# Patient Record
Sex: MALE | Race: ASIAN | URBAN - METROPOLITAN AREA
[De-identification: names, ages, dates, MRNs, and addresses within clinical notes are randomized per-mention and may not be internally consistent; named-entity substitution may affect disease eponyms.]

---

## 2023-03-01 ENCOUNTER — INPATIENT (INPATIENT)
Facility: HOSPITAL | Age: 68
LOS: 0 days | Discharge: HOME | DRG: 312 | End: 2023-03-02
Attending: HOSPITALIST | Admitting: HOSPITALIST
Payer: MEDICARE

## 2023-03-01 VITALS
HEART RATE: 107 BPM | DIASTOLIC BLOOD PRESSURE: 99 MMHG | OXYGEN SATURATION: 96 % | TEMPERATURE: 99 F | WEIGHT: 175.05 LBS | RESPIRATION RATE: 18 BRPM | SYSTOLIC BLOOD PRESSURE: 168 MMHG | HEIGHT: 64 IN

## 2023-03-01 DIAGNOSIS — I77.810 THORACIC AORTIC ECTASIA: ICD-10-CM

## 2023-03-01 DIAGNOSIS — I10 ESSENTIAL (PRIMARY) HYPERTENSION: ICD-10-CM

## 2023-03-01 DIAGNOSIS — N40.0 BENIGN PROSTATIC HYPERPLASIA WITHOUT LOWER URINARY TRACT SYMPTOMS: ICD-10-CM

## 2023-03-01 DIAGNOSIS — E11.9 TYPE 2 DIABETES MELLITUS WITHOUT COMPLICATIONS: ICD-10-CM

## 2023-03-01 DIAGNOSIS — Z29.9 ENCOUNTER FOR PROPHYLACTIC MEASURES, UNSPECIFIED: ICD-10-CM

## 2023-03-01 DIAGNOSIS — R53.1 WEAKNESS: ICD-10-CM

## 2023-03-01 DIAGNOSIS — R55 SYNCOPE AND COLLAPSE: ICD-10-CM

## 2023-03-01 DIAGNOSIS — M79.641 PAIN IN RIGHT HAND: ICD-10-CM
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
## 2023-03-01 DIAGNOSIS — E78.5 HYPERLIPIDEMIA, UNSPECIFIED: ICD-10-CM

## 2023-03-01 LAB
ALBUMIN SERPL ELPH-MCNC: 4.7 G/DL — SIGNIFICANT CHANGE UP (ref 3.3–5)
ALP SERPL-CCNC: 87 U/L — SIGNIFICANT CHANGE UP (ref 40–120)
ALT FLD-CCNC: 38 U/L — SIGNIFICANT CHANGE UP (ref 10–45)
ANION GAP SERPL CALC-SCNC: 14 MMOL/L — SIGNIFICANT CHANGE UP (ref 5–17)
APPEARANCE UR: CLEAR — SIGNIFICANT CHANGE UP
APTT BLD: 28.4 SEC — SIGNIFICANT CHANGE UP (ref 27.5–35.5)
AST SERPL-CCNC: 25 U/L — SIGNIFICANT CHANGE UP (ref 10–40)
BASE EXCESS BLDV CALC-SCNC: 3.9 MMOL/L — HIGH (ref -2–3)
BASOPHILS # BLD AUTO: 0.11 K/UL — SIGNIFICANT CHANGE UP (ref 0–0.2)
BASOPHILS NFR BLD AUTO: 1.1 % — SIGNIFICANT CHANGE UP (ref 0–2)
BILIRUB SERPL-MCNC: 0.7 MG/DL — SIGNIFICANT CHANGE UP (ref 0.2–1.2)
BILIRUB UR-MCNC: NEGATIVE — SIGNIFICANT CHANGE UP
BUN SERPL-MCNC: 13 MG/DL — SIGNIFICANT CHANGE UP (ref 7–23)
CA-I SERPL-SCNC: 1.22 MMOL/L — SIGNIFICANT CHANGE UP (ref 1.15–1.33)
CALCIUM SERPL-MCNC: 9.8 MG/DL — SIGNIFICANT CHANGE UP (ref 8.4–10.5)
CHLORIDE BLDV-SCNC: 99 MMOL/L — SIGNIFICANT CHANGE UP (ref 96–108)
CHLORIDE SERPL-SCNC: 100 MMOL/L — SIGNIFICANT CHANGE UP (ref 96–108)
CO2 BLDV-SCNC: 32 MMOL/L — HIGH (ref 22–26)
CO2 SERPL-SCNC: 25 MMOL/L — SIGNIFICANT CHANGE UP (ref 22–31)
COLOR SPEC: COLORLESS — SIGNIFICANT CHANGE UP
CREAT SERPL-MCNC: 0.67 MG/DL — SIGNIFICANT CHANGE UP (ref 0.5–1.3)
DIFF PNL FLD: NEGATIVE — SIGNIFICANT CHANGE UP
EGFR: 102 ML/MIN/1.73M2 — SIGNIFICANT CHANGE UP
EOSINOPHIL # BLD AUTO: 0.2 K/UL — SIGNIFICANT CHANGE UP (ref 0–0.5)
EOSINOPHIL NFR BLD AUTO: 2.1 % — SIGNIFICANT CHANGE UP (ref 0–6)
FLUAV AG NPH QL: SIGNIFICANT CHANGE UP
FLUBV AG NPH QL: SIGNIFICANT CHANGE UP
GAS PNL BLDV: 135 MMOL/L — LOW (ref 136–145)
GAS PNL BLDV: SIGNIFICANT CHANGE UP
GAS PNL BLDV: SIGNIFICANT CHANGE UP
GLUCOSE BLDC GLUCOMTR-MCNC: 195 MG/DL — HIGH (ref 70–99)
GLUCOSE BLDV-MCNC: 297 MG/DL — HIGH (ref 70–99)
GLUCOSE SERPL-MCNC: 282 MG/DL — HIGH (ref 70–99)
GLUCOSE UR QL: ABNORMAL
HCO3 BLDV-SCNC: 30 MMOL/L — HIGH (ref 22–29)
HCT VFR BLD CALC: 49.9 % — SIGNIFICANT CHANGE UP (ref 39–50)
HCT VFR BLDA CALC: 52 % — HIGH (ref 39–51)
HGB BLD CALC-MCNC: 17.4 G/DL — SIGNIFICANT CHANGE UP (ref 12.6–17.4)
HGB BLD-MCNC: 16.7 G/DL — SIGNIFICANT CHANGE UP (ref 13–17)
IMM GRANULOCYTES NFR BLD AUTO: 0.3 % — SIGNIFICANT CHANGE UP (ref 0–0.9)
INR BLD: 1.03 RATIO — SIGNIFICANT CHANGE UP (ref 0.88–1.16)
KETONES UR-MCNC: NEGATIVE — SIGNIFICANT CHANGE UP
LACTATE BLDV-MCNC: 2.5 MMOL/L — HIGH (ref 0.5–2)
LEUKOCYTE ESTERASE UR-ACNC: NEGATIVE — SIGNIFICANT CHANGE UP
LYMPHOCYTES # BLD AUTO: 2.16 K/UL — SIGNIFICANT CHANGE UP (ref 1–3.3)
LYMPHOCYTES # BLD AUTO: 22.4 % — SIGNIFICANT CHANGE UP (ref 13–44)
MCHC RBC-ENTMCNC: 31 PG — SIGNIFICANT CHANGE UP (ref 27–34)
MCHC RBC-ENTMCNC: 33.5 GM/DL — SIGNIFICANT CHANGE UP (ref 32–36)
MCV RBC AUTO: 92.8 FL — SIGNIFICANT CHANGE UP (ref 80–100)
MONOCYTES # BLD AUTO: 0.87 K/UL — SIGNIFICANT CHANGE UP (ref 0–0.9)
MONOCYTES NFR BLD AUTO: 9 % — SIGNIFICANT CHANGE UP (ref 2–14)
NEUTROPHILS # BLD AUTO: 6.26 K/UL — SIGNIFICANT CHANGE UP (ref 1.8–7.4)
NEUTROPHILS NFR BLD AUTO: 65.1 % — SIGNIFICANT CHANGE UP (ref 43–77)
NITRITE UR-MCNC: NEGATIVE — SIGNIFICANT CHANGE UP
NRBC # BLD: 0 /100 WBCS — SIGNIFICANT CHANGE UP (ref 0–0)
PCO2 BLDV: 50 MMHG — SIGNIFICANT CHANGE UP (ref 42–55)
PH BLDV: 7.39 — SIGNIFICANT CHANGE UP (ref 7.32–7.43)
PH UR: 7.5 — SIGNIFICANT CHANGE UP (ref 5–8)
PLATELET # BLD AUTO: 308 K/UL — SIGNIFICANT CHANGE UP (ref 150–400)
PO2 BLDV: 39 MMHG — SIGNIFICANT CHANGE UP (ref 25–45)
POTASSIUM BLDV-SCNC: 4.2 MMOL/L — SIGNIFICANT CHANGE UP (ref 3.5–5.1)
POTASSIUM SERPL-MCNC: 3.8 MMOL/L — SIGNIFICANT CHANGE UP (ref 3.5–5.3)
POTASSIUM SERPL-SCNC: 3.8 MMOL/L — SIGNIFICANT CHANGE UP (ref 3.5–5.3)
PROT SERPL-MCNC: 7.7 G/DL — SIGNIFICANT CHANGE UP (ref 6–8.3)
PROT UR-MCNC: NEGATIVE — SIGNIFICANT CHANGE UP
PROTHROM AB SERPL-ACNC: 11.9 SEC — SIGNIFICANT CHANGE UP (ref 10.5–13.4)
RBC # BLD: 5.38 M/UL — SIGNIFICANT CHANGE UP (ref 4.2–5.8)
RBC # FLD: 12.4 % — SIGNIFICANT CHANGE UP (ref 10.3–14.5)
RSV RNA NPH QL NAA+NON-PROBE: SIGNIFICANT CHANGE UP
SAO2 % BLDV: 67.2 % — SIGNIFICANT CHANGE UP (ref 67–88)
SARS-COV-2 RNA SPEC QL NAA+PROBE: SIGNIFICANT CHANGE UP
SODIUM SERPL-SCNC: 139 MMOL/L — SIGNIFICANT CHANGE UP (ref 135–145)
SP GR SPEC: 1.03 — HIGH (ref 1.01–1.02)
TROPONIN T, HIGH SENSITIVITY RESULT: 28 NG/L — SIGNIFICANT CHANGE UP (ref 0–51)
TROPONIN T, HIGH SENSITIVITY RESULT: 63 NG/L — HIGH (ref 0–51)
UROBILINOGEN FLD QL: NEGATIVE — SIGNIFICANT CHANGE UP
WBC # BLD: 9.63 K/UL — SIGNIFICANT CHANGE UP (ref 3.8–10.5)
WBC # FLD AUTO: 9.63 K/UL — SIGNIFICANT CHANGE UP (ref 3.8–10.5)

## 2023-03-01 PROCEDURE — 99291 CRITICAL CARE FIRST HOUR: CPT

## 2023-03-01 PROCEDURE — 71045 X-RAY EXAM CHEST 1 VIEW: CPT | Mod: 26

## 2023-03-01 PROCEDURE — 70496 CT ANGIOGRAPHY HEAD: CPT | Mod: 26,MA

## 2023-03-01 PROCEDURE — 99223 1ST HOSP IP/OBS HIGH 75: CPT

## 2023-03-01 PROCEDURE — 70498 CT ANGIOGRAPHY NECK: CPT | Mod: 26,MA

## 2023-03-01 PROCEDURE — 70486 CT MAXILLOFACIAL W/O DYE: CPT | Mod: 26,QQ

## 2023-03-01 PROCEDURE — 73130 X-RAY EXAM OF HAND: CPT | Mod: 26,RT

## 2023-03-01 RX ORDER — TAMSULOSIN HYDROCHLORIDE 0.4 MG/1
0.8 CAPSULE ORAL AT BEDTIME
Refills: 0 | Status: DISCONTINUED | OUTPATIENT
Start: 2023-03-01 | End: 2023-03-02

## 2023-03-01 RX ORDER — ATORVASTATIN CALCIUM 80 MG/1
10 TABLET, FILM COATED ORAL AT BEDTIME
Refills: 0 | Status: DISCONTINUED | OUTPATIENT
Start: 2023-03-01 | End: 2023-03-01

## 2023-03-01 RX ORDER — ASPIRIN/CALCIUM CARB/MAGNESIUM 324 MG
81 TABLET ORAL DAILY
Refills: 0 | Status: DISCONTINUED | OUTPATIENT
Start: 2023-03-01 | End: 2023-03-02

## 2023-03-01 RX ORDER — LISINOPRIL 2.5 MG/1
10 TABLET ORAL DAILY
Refills: 0 | Status: DISCONTINUED | OUTPATIENT
Start: 2023-03-01 | End: 2023-03-02

## 2023-03-01 RX ORDER — ENOXAPARIN SODIUM 100 MG/ML
40 INJECTION SUBCUTANEOUS EVERY 24 HOURS
Refills: 0 | Status: DISCONTINUED | OUTPATIENT
Start: 2023-03-01 | End: 2023-03-02

## 2023-03-01 RX ORDER — ASPIRIN/CALCIUM CARB/MAGNESIUM 324 MG
324 TABLET ORAL ONCE
Refills: 0 | Status: COMPLETED | OUTPATIENT
Start: 2023-03-01 | End: 2023-03-01

## 2023-03-01 RX ORDER — AMLODIPINE BESYLATE 2.5 MG/1
5 TABLET ORAL DAILY
Refills: 0 | Status: DISCONTINUED | OUTPATIENT
Start: 2023-03-01 | End: 2023-03-02

## 2023-03-01 RX ORDER — ATORVASTATIN CALCIUM 80 MG/1
80 TABLET, FILM COATED ORAL AT BEDTIME
Refills: 0 | Status: DISCONTINUED | OUTPATIENT
Start: 2023-03-01 | End: 2023-03-02

## 2023-03-01 RX ORDER — DONEPEZIL HYDROCHLORIDE 10 MG/1
5 TABLET, FILM COATED ORAL AT BEDTIME
Refills: 0 | Status: DISCONTINUED | OUTPATIENT
Start: 2023-03-01 | End: 2023-03-02

## 2023-03-01 RX ADMIN — Medication 324 MILLIGRAM(S): at 23:09

## 2023-03-01 NOTE — ED PROVIDER NOTE - CLINICAL SUMMARY MEDICAL DECISION MAKING FREE TEXT BOX
67-year-old male with past medical history of HTN, HLD, DM 2 presents to the ED with dizziness and recurrent falls starting today. Initial vitals nonactionable.  Code stroke was called and neurology at bedside.  Physical exam as noted above.  Well-appearing male without any acute distress.  Ambulating well without difficulty.  NIH stroke scale is 0.  Differential diagnosis includes but not limited to stroke versus cardiac syncope versus metabolic derangements versus viral syndrome.  Will order labs, EKG, meds, imaging, and reassess.

## 2023-03-01 NOTE — CONSULT NOTE ADULT - ASSESSMENT
Patient BROOKLYN is a 68yo R handed M with PMHx HTN, HLD, DM, on asa who presents to ED for dizziness and recurrent falls. States LKW 2:30PM 3/1/23 after which he experienced mild dizziness and then fall. Had 4 subsequent falls since. Had fall on R hand for which it is injured and weak.  He went to urgent care where they did EKG and were concerned for which told to come to ED. Denies headaches, nausea, vomiting, visual symptoms, speech disturbance, focal extremity numbness/weakness not attributed to injury. No history of stroke. Currently mostly asymptomatic and appears comfortable. In ED code stroke called for gait instability, dizziness falls. Examined patient in CT. Patient denied hx of renal dysfunction, contrast allergy and was amenable to contrast. CT head w/o con and CTA head/neck were obtained. cbc coags wnl. cmp w/ elevated glucose 282. troponin elevated 63. lactate 2.5.     LKW: 2:30PM 3/1 but currently reports asymptomatic  NIHSS: 0   Baseline MRS: 0  Not a TNKase candidate due to nondisabling isolated neurologic deficits that were improving  Not a thrombectomy candidate due to no reported LVO, pending CTA report.     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 No intracranial bleeding. Moderate chronic microvascular ischemic changes in the periventricular deep white matter.   CTA head/neck w/ con: pending report, to be followed up    Impression: Mild non-room spinning dizziness with recurrent falls of unclear etiology, potentially peripheral versus cardiac in etiology. Will evaluate for ischemic stroke although low current clinical suspicion.       Recommendations:  [ ] Can continue aspirin 81mg daily for now    [ ] Atorvastatin 40-80 mg daily titrated per LDL < 70  [ ] HgbA1C, fasting lipid panel, CBC, CMP, coag panel, troponin  [ ] MRI brain w/o con   [ ] TTE w/ bubble study, cardiac syncope workup per primary team  [ ] telemetry, EKG, trend troponins    - glucose control (long-term goal HgbA1c < 6%)   - Neuro-checks and VS q4h   - Dysphagia screen   - fall precautions  - STAT CT head non-contrast for change in neuro exam.   - PT/ OT / DVT ppx per primary team     Discussed with stroke fellow Dr Thang Brenner and under supervision of attending  Dr Duc Haynes  regarding decision against candidacy for TNKase/ thrombectomy. Will be formally staffed on morning rounds with attending. Recommendations will be complete once signed by attending. Patient BROOKLYN is a 68yo R handed M with PMHx HTN, HLD, DM, on asa who presents to ED for dizziness and recurrent falls. States LKW 2:30PM 3/1/23 after which he experienced mild dizziness and then fall. Had 4 subsequent falls since. Had fall on R hand for which it is injured and weak.  He went to urgent care where they did EKG and were concerned for which told to come to ED. Denies headaches, nausea, vomiting, visual symptoms, speech disturbance, focal extremity numbness/weakness not attributed to injury. No history of stroke. Currently mostly asymptomatic and appears comfortable. In ED code stroke called for gait instability, dizziness falls. Examined patient in CT. Patient denied hx of renal dysfunction, contrast allergy and was amenable to contrast. CT head w/o con and CTA head/neck were obtained. cbc coags wnl. cmp w/ elevated glucose 282. troponin elevated 63. lactate 2.5.     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 No intracranial bleeding. Moderate chronic microvascular ischemic changes in the periventricular deep white matter.   CTA head/neck w/ con: no flow limiting stenosis or occlusion. Has anatomic variants. Diffusely diminuitive R vertebral artery. Ascending aorta ectasia.     Impression: Mild non-room spinning dizziness with recurrent falls of unclear etiology, potentially peripheral versus cardiac in etiology. Will evaluate for ischemic stroke although low current clinical suspicion.       Recommendations:  [ ] Can continue aspirin 81mg daily for now    [ ] Atorvastatin 40-80 mg daily titrated per LDL < 70  [ ] HgbA1C, fasting lipid panel, CBC, CMP, coag panel, troponin  [ ] MRI brain w/o con   [ ] TTE w/ bubble study, cardiac syncope workup per primary team  [ ] telemetry, EKG, trend troponins  [ ] ascending aorta ectasia and remainder of imaging report findings w/u per primary team    - glucose control (long-term goal HgbA1c < 6%)   - Neuro-checks and VS q4h   - Dysphagia screen   - fall precautions  - STAT CT head non-contrast for change in neuro exam.   - PT/ OT / DVT ppx per primary team     Discussed with stroke fellow Dr Thang Brenner and under supervision of attending  Dr Duc Haynes  regarding decision against candidacy for TNKase/ thrombectomy. Will be formally staffed on morning rounds with attending. Recommendations will be complete once signed by attending. Patient BROOKLYN is a 68yo R handed M with PMHx HTN, HLD, DM, on asa who presents to ED for dizziness and recurrent falls. States LKW 2:30PM 3/1/23 after which he experienced mild dizziness and then fall. Had 4 subsequent falls since. Had fall on R hand for which it is injured and weak.  He went to urgent care where they did EKG and were concerned for which told to come to ED. Denies headaches, nausea, vomiting, visual symptoms, speech disturbance, focal extremity numbness/weakness not attributed to injury. No history of stroke. Currently mostly asymptomatic and appears comfortable. In ED code stroke called for gait instability, dizziness falls. Examined patient in CT. Patient denied hx of renal dysfunction, contrast allergy and was amenable to contrast. CT head w/o con and CTA head/neck were obtained. cbc coags wnl. cmp w/ elevated glucose 282. troponin elevated 63. lactate 2.5.     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 No intracranial bleeding. Moderate chronic microvascular ischemic changes in the periventricular deep white matter.     CTA head/neck w/ con: no flow limiting stenosis or occlusion. Has anatomic variants. Diffusely diminuitive R vertebral artery. Ascending aorta ectasia.     Impression: Mild non-room spinning dizziness with recurrent falls of unclear etiology, potentially peripheral vertigo versus cardiac in etiology. Our service will assist in evaluating for ischemic stroke although low current clinical suspicion.       Recommendations:  [ ] Can continue aspirin 81mg daily for now    [ ] Atorvastatin 40-80 mg daily titrated per LDL < 70  [ ] HgbA1C, fasting lipid panel, CBC, CMP, coag panel, troponin  [ ] MRI brain w/o con   [ ] TTE w/ bubble study, cardiac syncope workup per primary team  [ ] telemetry, EKG, trend troponins  [ ] ascending aorta ectasia and remainder of imaging report findings w/u per primary team    - glucose control (long-term goal HgbA1c < 6%)   - Neuro-checks and VS q4h   - Dysphagia screen   - fall precautions  - STAT CT head non-contrast for change in neuro exam.   - PT/ OT / DVT ppx per primary team     Discussed with stroke fellow Dr Thang Brenner and under supervision of attending  Dr Duc Haynes  regarding decision against candidacy for TNKase/ thrombectomy. Will be formally staffed on morning rounds with attending. Recommendations will be complete once signed by attending.

## 2023-03-01 NOTE — H&P ADULT - PROBLEM SELECTOR PLAN 3
Fell onto hand, no fracture on XR  - Ice packs  - Tylenol  - Consider celecoxib 100 mg BID (d/t ASA) if pain not controlled with ice and tylenol

## 2023-03-01 NOTE — H&P ADULT - NSHPLABSRESULTS_GEN_ALL_CORE
16.7   9.63  )-----------( 308      ( 01 Mar 2023 18:40 )             49.9     03-01    139  |  100  |  13  ----------------------------<  282<H>  3.8   |  25  |  0.67    Ca    9.8      01 Mar 2023 18:40    TPro  7.7  /  Alb  4.7  /  TBili  0.7  /  DBili  x   /  AST  25  /  ALT  38  /  AlkPhos  87  03-01    Troponin T, High Sensitivity Result: 63  Troponin T, High Sensitivity Result: 28    Blood Gas Venous - Lactate: 2.5    EKG with 16.7   9.63  )-----------( 308      ( 01 Mar 2023 18:40 )             49.9     03-01    139  |  100  |  13  ----------------------------<  282<H>  3.8   |  25  |  0.67    Ca    9.8      01 Mar 2023 18:40    TPro  7.7  /  Alb  4.7  /  TBili  0.7  /  DBili  x   /  AST  25  /  ALT  38  /  AlkPhos  87  03-01    Troponin T, High Sensitivity Result: 63  Troponin T, High Sensitivity Result: 28    Blood Gas Venous - Lactate: 2.5    x< from: Xray Hand 3 Views, Right (03.01.23 @ 19:27) >    JOINTS    Joint Space(s):  There are small-to-moderate sized osteophytes and tiny   joint bodies at the 1st carpometacarpal joint. Tiny 2nd through 5th   metacarpal phalangeal joint osteophytes are noted.Small intraosseous   ganglion are noted within the 3rd and 4th middle phalangeal bases.    SOFT TISSUES:  Soft tissue swelling is suggested around the metacarpal   phalangeal region. No gas is seen.    IMPRESSION:  1.  No acute osseous abnormalities.  < from: CT Angio Neck Stroke Protocol w/ IV Cont (03.01.23 @ 18:54) >    IMPRESSION:    CTA BRAIN: Patent central intracranial circulation. No flow-limiting   stenosis or occlusion.    Anatomic variants: Bilateral fetal PCA circulation. Hypoplastic right A1   segment.    CTA NECK: Patent cervical vasculature. No flow-limiting stenosis or   occlusion.    Ascending aortic ectasia to 4.1 cm.    --- End of Report ---    < end of copied text >      < end of copied text >        EKG sinus rhythm, no acute ischemic changes

## 2023-03-01 NOTE — CONSULT NOTE ADULT - SUBJECTIVE AND OBJECTIVE BOX
Neurology Consultation     Used [facility] [id number]    HPI: Patient BROOKLYN is a 68yo R handed M with PMHx HTN, HLD, DM, on asa who presents to ED for dizziness and recurrent falls. States LKW 2:30PM 3/1/23 after which he experienced mild dizziness and then fall. Had 4 subsequent falls since. Had fall on R hand for which it is injured and weak. Went to urgent care where they did EKG and were concerned for which told to come to ED. Denies headaches, nausea, vomiting, visual symptoms, speech disturbance, focal extremity numbness/weakness not attributed to injury. No history of stroke.     In ED code stroke called for gait instability, dizziness falls. Examined patient in CT. Patient denied hx of renal dysfunction, contrast allergy and was amenable to contrast. CT head w/o con and CTA head/neck were obtained.      NIHSS: 0  preMRS: 0       PAST MEDICAL & SURGICAL HISTORY:    FAMILY HISTORY:    SOCIAL HISTORY:      MEDICATIONS (HOME):  Home Medications:    MEDICATIONS  (STANDING):    MEDICATIONS  (PRN):    ALLERGIES/INTOLERANCES:  Allergies  No Known Allergies    Intolerances    VITALS & EXAMINATION:  Vital Signs Last 24 Hrs  T(C): 37 (01 Mar 2023 18:15), Max: 37 (01 Mar 2023 18:15)  T(F): 98.6 (01 Mar 2023 18:15), Max: 98.6 (01 Mar 2023 18:15)  HR: 107 (01 Mar 2023 18:15) (107 - 107)  BP: 168/99 (01 Mar 2023 18:15) (168/99 - 168/99)  BP(mean): --  RR: 18 (01 Mar 2023 18:15) (18 - 18)  SpO2: 96% (01 Mar 2023 18:15) (96% - 96%)    Parameters below as of 01 Mar 2023 18:15  Patient On (Oxygen Delivery Method): room air    General:  Constitutional: Male, appears stated age, not in distress, pleasant   Eyes: clear sclera;   Extremities: R hand with bruise, was bandaged by urgent care   Resp: breathing comfortably     Neurological (>12):  MS: Awake, alert. Oriented person place situation. Follows all commands. Attends to examiner  Language: Speech is hypophonic, clear, fluent, good repetition, comprehension, registration of words.  CNs: PERRL (R 3mm, L 3mm). VFF. EOMI no nystagmus. V1-3 intact LT, No carine facial asymmetry b/l. Has dried blood around lips. Hearing grossly normal.     Motor - Normal bulk and tone throughout. No pronator drift.    L/R         Deltoid  5/5    Biceps   5/5      Triceps  5/5         Wrist Extension 5/4   Wrist Flexion  5/4      5/4   (R distal extremity injury from fall)  L/R         Hip Flexion  5/5    Hip Extension  5/5  Knee Extension  5/5  Dorsiflexion  5/5      Plantar Flexion 5/5     Sensation: Intact to LT b/l.    Toes: mute  No ankle clonus  Coordination: No dysmetria to FTN b/l UE  Gait: mild wide based gait, knees bowed out. Able to ambulate independently     LABORATORY:  CBC                       16.7   9.63  )-----------( 308      ( 01 Mar 2023 18:40 )             49.9     Chem       LFTs   Coagulopathy   Lipid Panel   A1c   Cardiac enzymes     U/A   CSF  Other    STUDIES & IMAGING: (EEG, CT, MR, U/S, TTE/MATI): Neurology Consultation     Used [employer] [facility] [id number]    HPI: Patient BROOKLYN is a 68yo R handed M with PMHx HTN, HLD, DM, on asa who presents to ED for dizziness and recurrent falls. States LKW 2:30PM 3/1/23 after which he experienced mild dizziness and then fall. Had 4 subsequent falls since. Had fall on R hand for which it is injured and weak.  He went to urgent care where they did EKG and were concerned for which told to come to ED. Denies headaches, nausea, vomiting, visual symptoms, speech disturbance, focal extremity numbness/weakness not attributed to injury. No history of stroke. Currently mostly asymptomatic and appears comfortable. In ED code stroke called for gait instability, dizziness falls. Examined patient in CT. Patient denied hx of renal dysfunction, contrast allergy and was amenable to contrast. CT head w/o con and CTA head/neck were obtained.      NIHSS: 0  preMRS: 0       PAST MEDICAL & SURGICAL HISTORY: HTN HLD DM    MEDICATIONS (HOME):  Home Medications:    MEDICATIONS  (STANDING):    MEDICATIONS  (PRN):    ALLERGIES/INTOLERANCES:  Allergies  No Known Allergies    Intolerances    VITALS & EXAMINATION:  Vital Signs Last 24 Hrs  T(C): 37 (01 Mar 2023 18:15), Max: 37 (01 Mar 2023 18:15)  T(F): 98.6 (01 Mar 2023 18:15), Max: 98.6 (01 Mar 2023 18:15)  HR: 107 (01 Mar 2023 18:15) (107 - 107)  BP: 168/99 (01 Mar 2023 18:15) (168/99 - 168/99)  BP(mean): --  RR: 18 (01 Mar 2023 18:15) (18 - 18)  SpO2: 96% (01 Mar 2023 18:15) (96% - 96%)    Parameters below as of 01 Mar 2023 18:15  Patient On (Oxygen Delivery Method): room air    General:  Constitutional: Male, appears stated age, not in distress, pleasant   Eyes: clear sclera;   Extremities: R hand with bruise, was bandaged by urgent care   Resp: breathing comfortably     Neurological (>12):  MS: Awake, alert. Oriented person place situation. Follows all commands. Attends to examiner  Language: Speech is hypophonic, clear, fluent, good repetition, comprehension, registration of words.  CNs: PERRL (R 3mm, L 3mm). VFF. EOMI no nystagmus. V1-3 intact LT, No carine facial asymmetry b/l. Has dried blood around lips. Hearing grossly normal.     Motor - Normal bulk and tone throughout. No pronator drift.    L/R         Deltoid  5/5    Biceps   5/5      Triceps  5/5         Wrist Extension 5/4   Wrist Flexion  5/4      5/4   (R distal extremity injury from fall)  L/R         Hip Flexion  5/5    Hip Extension  5/5  Knee Extension  5/5  Dorsiflexion  5/5      Plantar Flexion 5/5     Sensation: Intact to LT b/l.    Toes: mute  No ankle clonus  Coordination: No dysmetria to FTN b/l UE  Gait: mild wide based gait, knees bowed out. Able to ambulate independently     LABORATORY:  CBC                       16.7   9.63  )-----------( 308      ( 01 Mar 2023 18:40 )             49.9     Chem       LFTs   Coagulopathy   Lipid Panel   A1c   Cardiac enzymes     U/A   CSF  Other    STUDIES & IMAGING: (EEG, CT, MR, U/S, TTE/MATI):    < from: CT Brain Stroke Protocol (03.01.23 @ 18:53) >    ACC: [id number] EXAM:  CT BRAIN STROKE PROTOCOL   ORDERED BY: SERGIO SEALS     PROCEDURE DATE:  03/01/2023          INTERPRETATION:  HISTORY: Code stroke. Dizziness and fall.    COMPARISON: None.    TECHNIQUE: Axial noncontrast CT images from the skull base to the vertex   were obtained and submitted for interpretation. Coronal and sagittal   reformatted images were performed. Bone and soft tissue windows were   evaluated.    FINDINGS:    There is no acute intracranial mass-effect, hemorrhage, midline shift, or   abnormal extra-axial fluid collection.    Chronic lacunar infarction in the posterior limb of the left internal   capsule extends into the corona radiata and centrum semiovale.    Mild centrally predominant cerebral volume lossand moderate chronic   microvascular ischemic changes in the periventricular deep white matter.   No hydrocephalus. Basal cisterns are patent.    Scattered ethmoid sinus mucosal thickening noted. Remaining paranasal   sinuses and mastoid air cells are clear. Calvarium is intact.    IMPRESSION:    No acute intracranial bleeding.    Chronic posterior limb left internal capsule/corona radiata/centrum   semiovale lacunar infarction.    Findings were discussed by Dr. Portillo with MD Gerard on 3/1/10001:49 PM   with read back confirmation.    --- End of Report ---    ARIEL PORTILLO MD; Resident Radiologist  This document has been electronically signed.  KADIE SMITH MD; Attending Radiologist  This document has been electronically signed. Mar 1 2023  7:49PM    < end of copied text >   Neurology Consultation     Used [employer] [facility] [id number]    HPI: Patient BROOKLYN is a 68yo R handed M with PMHx HTN, HLD, DM, on asa who presents to ED for dizziness and recurrent falls. States LKW 2:30PM 3/1/23 after which he experienced mild dizziness and then fall. Had 4 subsequent falls since. Had fall on R hand for which it is injured and weak.  He went to urgent care where they did EKG and were concerned for which told to come to ED. Denies headaches, nausea, vomiting, visual symptoms, speech disturbance, focal extremity numbness/weakness not attributed to injury. No history of stroke. Currently mostly asymptomatic and appears comfortable. In ED code stroke called for gait instability, dizziness falls. Examined patient in CT. Patient denied hx of renal dysfunction, contrast allergy and was amenable to contrast. CT head w/o con and CTA head/neck were obtained.      NIHSS: 0  preMRS: 0       PAST MEDICAL & SURGICAL HISTORY: HTN HLD DM    MEDICATIONS (HOME):  Home Medications:    MEDICATIONS  (STANDING):    MEDICATIONS  (PRN):    ALLERGIES/INTOLERANCES:  Allergies  No Known Allergies    Intolerances    VITALS & EXAMINATION:  Vital Signs Last 24 Hrs  T(C): 37 (01 Mar 2023 18:15), Max: 37 (01 Mar 2023 18:15)  T(F): 98.6 (01 Mar 2023 18:15), Max: 98.6 (01 Mar 2023 18:15)  HR: 107 (01 Mar 2023 18:15) (107 - 107)  BP: 168/99 (01 Mar 2023 18:15) (168/99 - 168/99)  BP(mean): --  RR: 18 (01 Mar 2023 18:15) (18 - 18)  SpO2: 96% (01 Mar 2023 18:15) (96% - 96%)    Parameters below as of 01 Mar 2023 18:15  Patient On (Oxygen Delivery Method): room air    General:  Constitutional: Male, appears stated age, not in distress, pleasant   Eyes: clear sclera;   Extremities: R hand with bruise, was bandaged by urgent care   Resp: breathing comfortably     Neurological (>12):  MS: Awake, alert. Oriented person place situation. Follows all commands. Attends to examiner  Language: Speech is hypophonic, clear, fluent, good repetition, comprehension, registration of words.  CNs: PERRL (R 3mm, L 3mm). VFF. EOMI no nystagmus. V1-3 intact LT, No carine facial asymmetry b/l. Has dried blood around lips. Hearing grossly normal.     Motor - Normal bulk and tone throughout. No pronator drift.    L/R         Deltoid  5/5    Biceps   5/5      Triceps  5/5         Wrist Extension 5/4   Wrist Flexion  5/4      5/4   (R distal extremity injury from fall)  L/R         Hip Flexion  5/5    Hip Extension  5/5  Knee Extension  5/5  Dorsiflexion  5/5      Plantar Flexion 5/5     Sensation: Intact to LT b/l.    Toes: mute  No ankle clonus  Coordination: No dysmetria to FTN b/l UE  Gait: mild wide based gait, knees bowed out. Able to ambulate independently     LABORATORY:  CBC                       16.7   9.63  )-----------( 308      ( 01 Mar 2023 18:40 )             49.9     Chem       LFTs   Coagulopathy   Lipid Panel   A1c   Cardiac enzymes     U/A   CSF  Other    STUDIES & IMAGING: (EEG, CT, MR, U/S, TTE/MATI):    < from: CT Brain Stroke Protocol (03.01.23 @ 18:53) >    ACC: [id number] EXAM:  CT BRAIN STROKE PROTOCOL   ORDERED BY: SERGIO SEALS     PROCEDURE DATE:  03/01/2023          INTERPRETATION:  HISTORY: Code stroke. Dizziness and fall.    COMPARISON: None.    TECHNIQUE: Axial noncontrast CT images from the skull base to the vertex   were obtained and submitted for interpretation. Coronal and sagittal   reformatted images were performed. Bone and soft tissue windows were   evaluated.    FINDINGS:    There is no acute intracranial mass-effect, hemorrhage, midline shift, or   abnormal extra-axial fluid collection.    Chronic lacunar infarction in the posterior limb of the left internal   capsule extends into the corona radiata and centrum semiovale.    Mild centrally predominant cerebral volume lossand moderate chronic   microvascular ischemic changes in the periventricular deep white matter.   No hydrocephalus. Basal cisterns are patent.    Scattered ethmoid sinus mucosal thickening noted. Remaining paranasal   sinuses and mastoid air cells are clear. Calvarium is intact.    IMPRESSION:    No acute intracranial bleeding.    Chronic posterior limb left internal capsule/corona radiata/centrum   semiovale lacunar infarction.    Findings were discussed by Dr. Portillo with MD Gerard on 3/1/63104:49 PM   with read back confirmation.    --- End of Report ---    ARIEL PORTILLO MD; Resident Radiologist  This document has been electronically signed.  KADIE SMITH MD; Attending Radiologist  This document has been electronically signed. Mar 1 2023  7:49PM    < end of copied text >      < from: CT Angio Neck Stroke Protocol w/ IV Cont (03.01.23 @ 18:54) >    ACC: [id number] EXAM:  CT ANGIO BRAIN STROKE PROTC IC   ORDERED BY: SERGIO SEALS     ACC: [id number] EXAM:  CT ANGIO NECK STROKE PROTCL IC   ORDERED BY: SERGIO SEALS     PROCEDURE DATE:  03/01/2023          INTERPRETATION:  HISTORY: Code stroke. Dizziness and fall.    COMPARISON: None    TECHNIQUE: CT angiogram of the neck and brain was performed after   administration of intravenous contrast. MIP and 3D reconstructions were   performed. Total of 70 cc Omnipaque 350 intravenous contrastwere   administered without complication.    FINDINGS:    CTA BRAIN    INTERNAL CAROTID ARTERIES: Trace atheromatous calcification along the   cavernous segments of the ICAs bilaterally. The intracranial segments of   the ICA are patent without hemodynamically significant stenosis,   occlusion, or aneurysm. The ICA bifurcations are unremarkable.    ANTERIOR CEREBRAL ARTERIES: Hypoplastic right A1 segment. No   flow-limiting stenosis or occlusion.    MIDDLE CEREBRAL ARTERIES: No flow-limiting stenosis or occlusion. MCA   bifurcations are unremarkable without aneurysm.    POSTERIOR CEREBRAL ARTERIES: Bilateral fetal PCA origins with   corresponding hypoplastic P1 segments. No flow-limiting stenosis or   occlusion.    VERTEBROBASILAR SYSTEM: Dominant left vertebral system. No flow-limiting   stenosis or occlusion.    CTA NECK    RIGHT CAROTID SYSTEM: Normal in course and caliber without flow-limiting   stenosis or occlusion.    LEFT CAROTID SYSTEM: Normal in course and caliber without flow-limiting   stenosis or occlusion. Mild atherosclerotic calcification of the left   cervical segment.    VERTEBRAL SYSTEM: Diffusely diminutive nondominant right vertebral   artery. Patent left vertebral artery without flow-limiting stenosis or   occlusion. Origin of the vertebral arteries are unremarkable.    AORTIC ARCH: Ascending aortic ectasia to 4.1 cm. Mild atherosclerotic   calcification of the aortic arch.    Right apical pleural-parenchymal interstitial thickening and   calcification with associated architectural distortion.    IMPRESSION:    CTA BRAIN: Patent central intracranial circulation. No flow-limiting   stenosis or occlusion.    Anatomic variants: Bilateral fetal PCA circulation. Hypoplastic right A1   segment.    CTA NECK: Patent cervical vasculature. No flow-limiting stenosis or   occlusion.    Ascending aortic ectasia to 4.1 cm.    --- End of Report ---    ARIEL PORTILLO MD; Resident Radiologist  This document has been electronically signed.  KADIE SMITH MD; Attending Radiologist  This document has been electronically signed. Mar  1 2023  8:37PM    < end of copied text >

## 2023-03-01 NOTE — ED ADULT NURSE NOTE - NSIMPLEMENTINTERV_GEN_ALL_ED
Implemented All Fall with Harm Risk Interventions:  [place] to call system. Call bell, personal items and telephone within reach. Instruct patient to call for assistance. Room bathroom lighting operational. Non-slip footwear when patient is off stretcher. Physically safe environment: no spills, clutter or unnecessary equipment. Stretcher in lowest position, wheels locked, appropriate side rails in place. Provide visual cue, wrist band, yellow gown, etc. Monitor gait and stability. Monitor for mental status changes and reorient to person, place, and time. Review medications for side effects contributing to fall risk. Reinforce activity limits and safety measures with patient and family. Provide visual clues: red socks.

## 2023-03-01 NOTE — H&P ADULT - PROBLEM SELECTOR PLAN 2
As above with episode of multiple falls today.   - Workup as above  - PT consult  - Fall precautions

## 2023-03-01 NOTE — H&P ADULT - HISTORY OF PRESENT ILLNESS
67M with PMHx T2DM, HTN, HLD, presents after an episode of lower extremity weakness with 5 falls within a period of about 5 minutes earlier today. He was in the park when he suddenly had lower extremity weakness and fell to the ground. He said that at this time, he tripped on a pothole. Upon trying to get up by holding a wall, he fell 2 more times, then was able to stand up and take a few steps before falling two more times. He denies any dizziness, headache, loss of consciousness, or head-strike. He also reports hitting his lip on the ground, as well as injuring his right hand as he used it to try to stand up. Another person helped him into a car and drove him home, and then reports that after he got home, his strength came back. He denies anything like this having happened to him before. Denies numbness, tingling, recent illnesses, diarrhea, chest pain, shortness of breath, palpitations.    In ED, STROKE CODE called, did not receive tPA due to non-disabling isolated deficits that improved. Imaging showed chronic posterior limb of left internal capsule infarct with no bleed and moderate chronic microvascular ischemic changes. Also found anatomic variants, diminutive R vertebral artery and ascending aortic ectasia. Troponin elevated to 63. 67M with PMHx T2DM, HTN, HLD, presents after an episode of lower extremity weakness with 5 falls within a period of about 5 minutes earlier today. He was in the park when he suddenly had lower extremity weakness and fell to the ground. He said that at this time, he tripped on a pothole. Upon trying to get up by holding a wall, he fell 2 more times, then was able to stand up and take a few steps before falling two more times. He denies any dizziness, headache, loss of consciousness, or head-strike. He also reports hitting his lip on the ground, as well as injuring his right hand as he used it to try to stand up. Another person helped him into a car and drove him home, and then reports that after he got home, his strength came back. He denies anything like this having happened to him before. Denies numbness, tingling, recent illnesses, diarrhea, chest pain, shortness of breath, palpitations. Reports feeling improved in the ED.    In ED, STROKE CODE called, did not receive tPA due to non-disabling isolated deficits that improved. Imaging showed chronic posterior limb of left internal capsule infarct with no bleed and moderate chronic microvascular ischemic changes. Also found anatomic variants, diminutive R vertebral artery and ascending aortic ectasia to 4.1cm. Troponin elevated to 63. Given ASA 324mg in ED. Evaluated by neurology. Admitted to medicine for further workup.

## 2023-03-01 NOTE — H&P ADULT - NSHPREVIEWOFSYSTEMS_GEN_ALL_CORE
Review of Systems:   CONSTITUTIONAL: No fever, weight loss  EYES: No eye pain, visual disturbances, or discharge  ENMT:  No difficulty hearing, tinnitus, vertigo; No sinus or throat pain  RESPIRATORY: No SOB. No cough, wheezing, chills or hemoptysis  CARDIOVASCULAR: No chest pain, palpitations, dizziness, or leg swelling  GASTROINTESTINAL: No abdominal or epigastric pain. No nausea, vomiting, or hematemesis; No diarrhea or constipation. No melena or hematochezia.  GENITOURINARY: No dysuria, frequency, hematuria, or incontinence  NEUROLOGICAL: No headaches, memory loss, numbness, or tremors. +episodes of weakness as above  SKIN: No itching, burning, rashes, or lesions   LYMPH NODES: No enlarged glands  MUSCULOSKELETAL:No muscle, back pain. R hand swelling and pain  HEME/LYMPH: No easy bruising, or bleeding gums

## 2023-03-01 NOTE — ED PROVIDER NOTE - OBJECTIVE STATEMENT
67-year-old male with past medical history of HTN, HLD, DM 2 presents to the ED with dizziness and recurrent falls starting today.  Patient was outside ambulating nearby park before experiencing a mechanical fall.  Patient is then had subsequently 4 additional falls secondary to "weak arms and legs ".  He denies any chest pain or shortness of breath orthopnea or lower extremity swelling.  No exacerbating or alleviating factors.  He was seen in urgent care and instructed come to the ED for further evaluation and management.  Currently asymptomatic at bedside.  He denies any focal neurologic deficits, numbness, or tingling, headaches, fevers, chills, nausea, vomiting, diarrhea.  He denies any diaphoresis.  He denies any other symptoms at bedside.

## 2023-03-01 NOTE — H&P ADULT - PROBLEM SELECTOR PLAN 4
F/u A1c  - Sliding scale insulin and adjust, likely need to add basal bolus  - CC diet  - Hold home metformin, jardiance, pioglitazone

## 2023-03-01 NOTE — ED PROVIDER NOTE - PROGRESS NOTE DETAILS
Patient discussed with neurology, recommending an MRI.  Madeleine is recommending CDU admission for further evaluation of possible stroke however low suspicion for stroke.  Upon review of the lab work, notable for troponin elevation of 63 in the setting of a normal creatinine and without hemolysis either.  Concerns for cardiac cause of syncope at this time.  EKG is nonischemic in nature.  No pathologic T wave inversions or ST segment depressions.  Will admit to medicine for cardiac syncope work-up. Aspirin given. Pending repeat troponin, not having active chest pain at bedside.

## 2023-03-01 NOTE — H&P ADULT - ASSESSMENT
67M with PMHx T2DM, HTN, HLD, presents after an episode of lower extremity weakness with 5 falls within a period of about 5 minutes. Admitted for workup of CVA vs TIA vs Cardiogenic etiology given elevated and downtrended troponin.  67M with PMHx T2DM, HTN, HLD, presents after an episode of lower extremity weakness with 5 falls within a period of about 5 minutes. Admitted for workup of CVA vs TIA  vs vertigo vs Cardiogenic etiology given elevated and downtrended troponin.

## 2023-03-01 NOTE — H&P ADULT - PROBLEM SELECTOR PLAN 1
TIA vs cardiogenic. Appreciate neuro recs. Unclear etiology at this time. Troponin downtrending, never had chest pain, SOB, or sx other than episode of weakness.  - Rec'd , c/w ASA 81  - Appreciate Neuro recs  - MRA  - Statin  - F/u A1c, lipids  - TTE w bubble study  - Telemetry TIA vs cardiogenic. Appreciate neuro recs. Unclear etiology at this time. Troponin downtrending, never had chest pain, SOB, or sx other than episode of weakness.  - Rec'd , c/w ASA 81  - Appreciate Neuro recs  - MRI brain  - Statin  - F/u A1c, lipids  - TTE w bubble study  - Telemetry

## 2023-03-01 NOTE — CONSULT NOTE ADULT - ATTENDING COMMENTS
code stroke was called and neurology emergently assessed patient. was determined by on call neuro stroke team that patient is not a tnk or EVT candidate   Briefly    68yo R handed M with HTN, HLD, DM, on asa who presents to ED for dizziness and recurrent falls. States LKW 2:30PM 3/1/23 after which he experienced mild dizziness and then fall. Had 4 subsequent falls since. Had fall on R hand for which it is injured and weak.  He went to urgent care where they did EKG and were concerned for which told to come to ED. Denies headaches, nausea, vomiting, visual symptoms, speech disturbance, focal extremity numbness/weakness not attributed to injury. No history of stroke. Currently mostly asymptomatic and appears comfortable. In ED code stroke called for gait instability, dizziness falls.   LKW: 2:30PM 3/1 but currently reports asymptomatic  NIHSS: 0   Baseline MRS: 0  Not a TNKase candidate due to nondisabling isolated neurologic deficits that were improving  Not a thrombectomy candidate due to no LVO   CT head w/o con: Chronic posterior limb left internal capsule/corona radiata/centrum semiovale lacunar infarction. No intracranial bleeding. Moderate chronic microvascular ischemic changes in the periventricular deep white matter.     CTA head/neck w/ con: no flow limiting stenosis or occlusion. Has anatomic variants. Diffusely diminuitive R vertebral artery. Ascending aorta ectasia.   troponin elevated 63. lactate 2.5.   o/e no focal findings. some hypomimesis.     Impression: Mild non-room spinning dizziness with recurrent falls of unclear etiology, potentially peripheral vertigo versus cardiac in etiology.   Prior chronic L BG infarct is likely 2/2 small vessel disease  I have some concern that he may have early PD, need to evaluate further in feature     Recommendations:  - ASA 81mg PO daily.  - High dose statin therapy - atorvastatin 40mg PO daily. LDL goal <70mg/dL.  - MRI brain w/o pending   - Hemoglobin A1c and lipid panel  - TTE  - infectious workup   - cardiac workup   - telemetry  - PT/OT/SS/SLP, OOBC  - BP normotensive   - check orthostatics   - check FS, glucose control <180  - GI/DVT ppx  - Counseling on diet, exercise, and medication adherence was done  - Counseling on smoking cessation and alcohol consumption offered when appropriate.  - Pain assessed and judicious use of narcotics when appropriate was discussed.    - Stroke education given when appropriate.  - Importance of fall prevention discussed.   - Differential diagnosis and plan of care discussed with patient and/or family and primary team  - Thank you for allowing me to participate in the care of this patient. Call with questions.   Igor Keller MD  Vascular Neurology  Office: [phone]

## 2023-03-01 NOTE — ED ADULT TRIAGE NOTE - CHIEF COMPLAINT QUOTE
Telephone Encounter by Bella Ross RN at 01/23/18 02:49 PM     Author:  Bella Ross RN Service:  (none) Author Type:  Registered Nurse-Ancillary     Filed:  01/23/18 02:50 PM Encounter Date:  1/22/2018 Status:  Signed     :  Bella Ross RN (Registered Nurse-Ancillary)            please call and offer ofc with Dr. Lopez  on 2/5/18 at  1:30 pm or 3:30 pm   ok to double book[MR1.1M]      Revision History        User Key Date/Time User Provider Type Action    > MR1.1 01/23/18 02:50 PM Bella Ross RN Registered Nurse-Ancillary Sign    M - Manual            
Telephone Encounter by Griselda Lopez DO at 01/22/18 02:36 PM     Author:  Griselda Lopez DO Service:  (none) Author Type:  Physician     Filed:  01/22/18 02:37 PM Encounter Date:  1/22/2018 Status:  Signed     :  Griselda Lopez DO (Physician)            Received call from Dr. Miramontes  Patient has severe MR on trans-esophageal echocardiogram  He will proceed with pulmonary vein isolation and cryoablation  Will need cardiac cath and then referral to Dr. Abreu for possible MVR    Please schedule follow-up with me within 1-2 weeks to discuss this further[FH1.1M]     Electronically Signed by:    Griselda Lopez DO , 1/22/2018[FH1.2T]        Revision History        User Key Date/Time User Provider Type Action    > FH1.2 01/22/18 02:37 PM Griselda Lopez DO Physician Sign     FH1.1 01/22/18 02:36 PM Griselda Lopez DO Physician     M - Manual, T - Template            
Telephone Encounter by Susan Lima RN at 01/24/18 09:21 AM     Author:  Susan Lima RN Service:  (none) Author Type:  Registered Nurse     Filed:  01/24/18 09:21 AM Encounter Date:  1/22/2018 Status:  Signed     :  Susan Lima RN (Registered Nurse)            Patient cannot make it to  as per date and times below, therefore booked patient on 2/2 at 2pm at [facility] for a follow up.[YP1.1M]        Revision History        User Key Date/Time User Provider Type Action    > YP1.1 01/24/18 09:21 AM Susan Lima RN Registered Nurse Sign    M - Manual            
dizziness fell 5 x today, r hnd inj and lip oinj

## 2023-03-01 NOTE — ED PROVIDER NOTE - NS ED MD DISPO ISOLATION TYPES
Pediatric Well Child Exam: 1 Month of age    Chief Complaint   Patient presents with   • Well Child     1 MONTH [facility]       SUBJECTIVE:  Amanda Murphy is a 4 week old female who presents for a 1 month old well child exam.  Patient presents with Mother.    CONCERNS RAISED TODAY: none    SLEEP PATTERN:  Hours / Night: waking every 3-6 hours to eat.    NAPS: Hours / Day: 3-4 hours.    DIET/GI:  FEEDING: both breast and bottle - Enfamil Gentlease 4 ounces   STOOLS: 2 / day.    /HOMECARE:    TYPE: family member     FAMILY/HOME ENVIRONMENT:  Parental Adjustment: good  Sibling Adjustment: N/A  Maternal Depression:  · Little interest or pleasure in doing anything:  []  YES    [x]  NO   · Feeling down, depressed or hopeless:         []  YES    [x]  NO   Parents working outside the home: mother and father  Toxic Exposure:  Tobacco Use: Never             DEVELOPMENT:  [x]  YES     []  NO     []  UNKNOWN    Turns head in supine?  [x]  YES     []  NO     []  UNKNOWN    Sucks well?  [x]  YES     []  NO     []  UNKNOWN    Makes eye contact?  [x]  YES     []  NO     []  UNKNOWN    Startles to loud noises?  [x]  YES     []  NO     []  UNKNOWN    Moves both sides of body equally?    OBJECTIVE:  BIRTH HISTORY:  Birth History   • Birth     Length: 20.98\" (53.3 cm)     Weight: 3.51 kg (7 lb 11.8 oz)     HC 32.5 cm (12.8\")   • Apgar     One: 8     Five: 9   • Discharge Weight: 3.32 kg (7 lb 5.1 oz)   • Delivery Method: Vaginal, Spontaneous   • Gestation Age: 40 6/7 wks   • Hospital Name: [facility]       Mom O pos, Baby O pos, scooter neg  Serologies neg  TcB 3        FAMILY HISTORY:  Family History   Problem Relation Age of Onset   • Patient is unaware of any medical problems Mother    • Patient is unaware of any medical problems Father    • Diabetes Maternal Grandmother      Review of patient's family status indicates:    Mother                                                   Father                                                    Paternal Aunt                                              Comment: Lupus    Maternal Grandmother                                       PAST HISTORIES:  Allergies, Medications, Medical history, Surgical history, Family history reviewed and updated.    RECENT HEALTH EVENTS:  Illnesses: None.  Hospitalizations: None.  Injuries or Accidents: None.    REVIEW OF SYSTEMS:    All systems reviewed and negative except as documented in \"Concerns raised today\".    PHYSICAL EXAM:      VITAL SIGNS:   Visit Vitals  Temp 97.9 °F (36.6 °C) (Temporal)   Ht 22.25\" (56.5 cm)   Wt 4.4 kg (9 lb 11.2 oz)   HC 37 cm (14.57\")   BMI 13.78 kg/m²    65 %ile (Z= 0.39) based on WHO (Girls, 0-2 years) weight-for-age data using vitals from 2020.  GENERAL: Well appearing female infant in no acute distress. Alert and consolable.  SKIN: Warm, normal turgor. No cyanosis. No rash.   HEAD: Normocephalic, atraumatic. Anterior fontanel open, soft and flat.  EYES: Conjunctivae appear normal, noninjected, nonicteric, positive red reflex.  NOSE: Appears normal.  No flaring.  EARS: Normal pinnae, no preauricular skin tags. Tympanic membranes are transparent with normal landmarks.  THROAT: Oropharynx with moist mucous membranes and no lesions.  NECK: Supple. No lymphadenopathy or masses.  HEART: Regular rate and rhythm. Normal S1, S2.  No murmurs.   LUNGS: Clear to auscultation. No wheezes, rales, rhonchi. Normal work effort with breathing.  ABDOMEN: Soft, nontender. No organomegaly or masses.  GENITOURINARY: Joel stage 1. Normal female genitalia.  Rectum/anus patent.  EXTREMITIES: Hips - normal range of motion. Negative Tao and Ortolani's.  Equal femoral pulses. Peripheral pulses 2/4.  SPINE: Spine appears straight. Normal sacrum.  NEUROLOGIC: Normal tone, bulk, strength.      Assessment:  4 week old female well infant.    Plan:  1. All parental concerns and questions discussed.  2. Anticipatory guidance provided, handout/s  given Car seat use  3. Feeding  4. Normal  Behaviors  5. Accident Prevention  6. SIDS Prevention  7. Fever Management  8. Immunizations per orders. Risks, benefits, and side effects discussed.    Follow up: Return in about 4 weeks (around 2020) for 2 mo [facility].   None

## 2023-03-01 NOTE — ED PROVIDER NOTE - ATTENDING CONTRIBUTION TO CARE
Patient brought in as code stoke, multiple falls with abnormal ekg and concern for CVA vs dysrhythmia will  get iv, cbc, cmp, ce, ekg, cta head/neck, cardiac monitor  See MDM above.  Will follow up on labs, analgesia, imaging, reassess and disposition to the inpatient team as clinically indicated.  *The above represents an initial assessment/impression. Please refer to my progress notes below for potential changes in patient clinical course*  Patient endorsed to Dr. Yee at the time of admission. Based on patient's history and physical exam, as well as the results of today's workup, I feel that patient warrants admission to the hospital for further workup/evaluation and continued management. I discussed the findings of today's workup with the patient and addressed the patient's questions and concerns. The patient was agreeable with admission. Our team spoke with the inpatient receiving team who accepted the patient for admission and subsequently took over the patient's care at the time of admission. The receiving team will follow up on pending labs, analgesia, any clinical imaging results, ancillary findings, reassess, and disposition as clinically indicated. Details of patient and plan conveyed to receiving physician team and conveyed back for understanding. There were no questions at this time about the patient's status, disposition, and plan. Patient's care to be taken over by receiving physician team at this time, all decisions regarding the progression of care will be made at their discretion.

## 2023-03-01 NOTE — ED ADULT NURSE NOTE - OBJECTIVE STATEMENT
67y M A&Ox4 c/o dizziness s/p multiple falls. Code stroke called in triage at 1828. Pt Kinyarwanda speaking,  used. Pt states around 230pm he was walking in the park when he suddenly became dizzy and fell forward landing on his right wrist and face, sustaining a laceration to his upper lip, injury to the right wrist and weakness when trying to get up. Pt then states he fell 4 more times. Pt then went to  and was told to come to the ED for an abnormal EKG. Pt also told by  that he may have sprained his wrist. Upon assessment pt has no slurred speech, no limb drift, no facial droop and no weakness. Right wrist wrapped with ace bandage by , Dried blood around mouth with no active bleeding noted. Pt denies any Ha, blurry vision, cough, N, V, D, CP, SOB, GI, symptoms. Endorses pain to right wrist and upper lip. PMH of DM, HTN, HLD and BPH. NO PSH.

## 2023-03-01 NOTE — H&P ADULT - NSHPPHYSICALEXAM_GEN_ALL_CORE
Vital Signs Last 24 Hrs  T(C): 36.4 (01 Mar 2023 23:28), Max: 37.1 (01 Mar 2023 18:57)  T(F): 97.5 (01 Mar 2023 23:28), Max: 98.8 (01 Mar 2023 18:57)  HR: 95 (01 Mar 2023 23:28) (95 - 107)  BP: 117/83 (01 Mar 2023 23:28) (117/83 - 168/99)  BP(mean): --  RR: 18 (01 Mar 2023 23:28) (18 - 24)  SpO2: 95% (01 Mar 2023 23:28) (95% - 98%)    Parameters below as of 01 Mar 2023 23:28  Patient On (Oxygen Delivery Method): room air        CONSTITUTIONAL: Well-groomed, in no apparent distress  EYES: No conjunctival or scleral injection, non-icteric; PERRLA and symmetric  ENMT: No external nasal lesions; nasal mucosa not inflamed; normal dentition, dried blood in mouth; no pharyngeal injection or exudates, oral mucosa with moist membranes  NECK: Trachea midline without palpable neck mass; thyroid not enlarged and non-tender  RESPIRATORY: Breathing comfortably; no dullness to percussion; lungs CTA without wheeze/rhonchi/rales  CARDIOVASCULAR: +S1S2, RRR, no M/G/R; no carotid bruits; pedal pulses full and symmetric; no lower extremity edema  GASTROINTESTINAL: No palpable masses or tenderness, no rebound/guarding; no hernia palpated  LYMPHATIC: No cervical LAD or tenderness  MUSCULOSKELETAL: Normal gait and station; no digital clubbing or cyanosis; no paraspinal tenderness; examination of the  spine without misalignment, normal strength and tone of extremities, RUE strength limited by pain. R hand swelling and ecchymosis  SKIN: No rashes or ulcers noted; no subcutaneous nodules or induration palpable  NEUROLOGIC: CN II-XII intact; sensation intact in LEs b/l to light touch  PSYCHIATRIC: A+O x 3; mood and affect appropriate; appropriate insight and judgment

## 2023-03-01 NOTE — H&P ADULT - NSHPSOCIALHISTORY_GEN_ALL_CORE
Awake/Alert Retired. Ambulates independently. Lives with wife.   Denies smoking. Endorses rare EtOH use (twice per month). Denies other drug use

## 2023-03-02 ENCOUNTER — TRANSCRIPTION ENCOUNTER (OUTPATIENT)
Age: 68
End: 2023-03-02

## 2023-03-02 VITALS
DIASTOLIC BLOOD PRESSURE: 77 MMHG | HEART RATE: 88 BPM | OXYGEN SATURATION: 98 % | SYSTOLIC BLOOD PRESSURE: 113 MMHG | TEMPERATURE: 98 F | RESPIRATION RATE: 17 BRPM

## 2023-03-02 LAB
A1C WITH ESTIMATED AVERAGE GLUCOSE RESULT: 8.3 % — HIGH (ref 4–5.6)
ANION GAP SERPL CALC-SCNC: 14 MMOL/L — SIGNIFICANT CHANGE UP (ref 5–17)
BUN SERPL-MCNC: 14 MG/DL — SIGNIFICANT CHANGE UP (ref 7–23)
CALCIUM SERPL-MCNC: 9 MG/DL — SIGNIFICANT CHANGE UP (ref 8.4–10.5)
CHLORIDE SERPL-SCNC: 103 MMOL/L — SIGNIFICANT CHANGE UP (ref 96–108)
CHOLEST SERPL-MCNC: 195 MG/DL — SIGNIFICANT CHANGE UP
CO2 SERPL-SCNC: 23 MMOL/L — SIGNIFICANT CHANGE UP (ref 22–31)
CREAT SERPL-MCNC: 0.63 MG/DL — SIGNIFICANT CHANGE UP (ref 0.5–1.3)
EGFR: 104 ML/MIN/1.73M2 — SIGNIFICANT CHANGE UP
ESTIMATED AVERAGE GLUCOSE: 192 MG/DL — HIGH (ref 68–114)
GLUCOSE BLDC GLUCOMTR-MCNC: 150 MG/DL — HIGH (ref 70–99)
GLUCOSE BLDC GLUCOMTR-MCNC: 333 MG/DL — HIGH (ref 70–99)
GLUCOSE SERPL-MCNC: 209 MG/DL — HIGH (ref 70–99)
HCT VFR BLD CALC: 45.5 % — SIGNIFICANT CHANGE UP (ref 39–50)
HCV AB S/CO SERPL IA: 0.06 S/CO — SIGNIFICANT CHANGE UP (ref 0–0.99)
HCV AB SERPL-IMP: SIGNIFICANT CHANGE UP
HDLC SERPL-MCNC: 49 MG/DL — SIGNIFICANT CHANGE UP
HGB BLD-MCNC: 15.5 G/DL — SIGNIFICANT CHANGE UP (ref 13–17)
LIPID PNL WITH DIRECT LDL SERPL: 113 MG/DL — HIGH
MCHC RBC-ENTMCNC: 31.3 PG — SIGNIFICANT CHANGE UP (ref 27–34)
MCHC RBC-ENTMCNC: 34.1 GM/DL — SIGNIFICANT CHANGE UP (ref 32–36)
MCV RBC AUTO: 91.7 FL — SIGNIFICANT CHANGE UP (ref 80–100)
NON HDL CHOLESTEROL: 146 MG/DL — HIGH
NRBC # BLD: 0 /100 WBCS — SIGNIFICANT CHANGE UP (ref 0–0)
PLATELET # BLD AUTO: 282 K/UL — SIGNIFICANT CHANGE UP (ref 150–400)
POTASSIUM SERPL-MCNC: 3.4 MMOL/L — LOW (ref 3.5–5.3)
POTASSIUM SERPL-SCNC: 3.4 MMOL/L — LOW (ref 3.5–5.3)
RBC # BLD: 4.96 M/UL — SIGNIFICANT CHANGE UP (ref 4.2–5.8)
RBC # FLD: 12.7 % — SIGNIFICANT CHANGE UP (ref 10.3–14.5)
SODIUM SERPL-SCNC: 140 MMOL/L — SIGNIFICANT CHANGE UP (ref 135–145)
TRIGL SERPL-MCNC: 168 MG/DL — HIGH
WBC # BLD: 7.37 K/UL — SIGNIFICANT CHANGE UP (ref 3.8–10.5)
WBC # FLD AUTO: 7.37 K/UL — SIGNIFICANT CHANGE UP (ref 3.8–10.5)

## 2023-03-02 PROCEDURE — 70486 CT MAXILLOFACIAL W/O DYE: CPT | Mod: QQ

## 2023-03-02 PROCEDURE — 99285 EMERGENCY DEPT VISIT HI MDM: CPT

## 2023-03-02 PROCEDURE — 99239 HOSP IP/OBS DSCHRG MGMT >30: CPT

## 2023-03-02 PROCEDURE — 92523 SPEECH SOUND LANG COMPREHEN: CPT

## 2023-03-02 PROCEDURE — 84132 ASSAY OF SERUM POTASSIUM: CPT

## 2023-03-02 PROCEDURE — 70498 CT ANGIOGRAPHY NECK: CPT | Mod: MA

## 2023-03-02 PROCEDURE — 85610 PROTHROMBIN TIME: CPT

## 2023-03-02 PROCEDURE — 97161 PT EVAL LOW COMPLEX 20 MIN: CPT

## 2023-03-02 PROCEDURE — 36415 COLL VENOUS BLD VENIPUNCTURE: CPT

## 2023-03-02 PROCEDURE — 85025 COMPLETE CBC W/AUTO DIFF WBC: CPT

## 2023-03-02 PROCEDURE — 71045 X-RAY EXAM CHEST 1 VIEW: CPT

## 2023-03-02 PROCEDURE — 81003 URINALYSIS AUTO W/O SCOPE: CPT

## 2023-03-02 PROCEDURE — 70450 CT HEAD/BRAIN W/O DYE: CPT | Mod: MA

## 2023-03-02 PROCEDURE — 86803 HEPATITIS C AB TEST: CPT

## 2023-03-02 PROCEDURE — 70551 MRI BRAIN STEM W/O DYE: CPT | Mod: 26

## 2023-03-02 PROCEDURE — 87637 SARSCOV2&INF A&B&RSV AMP PRB: CPT

## 2023-03-02 PROCEDURE — 82803 BLOOD GASES ANY COMBINATION: CPT

## 2023-03-02 PROCEDURE — 80061 LIPID PANEL: CPT

## 2023-03-02 PROCEDURE — 80048 BASIC METABOLIC PNL TOTAL CA: CPT

## 2023-03-02 PROCEDURE — 82962 GLUCOSE BLOOD TEST: CPT

## 2023-03-02 PROCEDURE — 93306 TTE W/DOPPLER COMPLETE: CPT | Mod: 26

## 2023-03-02 PROCEDURE — 76377 3D RENDER W/INTRP POSTPROCES: CPT

## 2023-03-02 PROCEDURE — 70551 MRI BRAIN STEM W/O DYE: CPT

## 2023-03-02 PROCEDURE — 80053 COMPREHEN METABOLIC PANEL: CPT

## 2023-03-02 PROCEDURE — 70496 CT ANGIOGRAPHY HEAD: CPT | Mod: MA

## 2023-03-02 PROCEDURE — 93356 MYOCRD STRAIN IMG SPCKL TRCK: CPT

## 2023-03-02 PROCEDURE — 83605 ASSAY OF LACTIC ACID: CPT

## 2023-03-02 PROCEDURE — 82435 ASSAY OF BLOOD CHLORIDE: CPT

## 2023-03-02 PROCEDURE — 73130 X-RAY EXAM OF HAND: CPT

## 2023-03-02 PROCEDURE — 85014 HEMATOCRIT: CPT

## 2023-03-02 PROCEDURE — 85027 COMPLETE CBC AUTOMATED: CPT

## 2023-03-02 PROCEDURE — 82330 ASSAY OF CALCIUM: CPT

## 2023-03-02 PROCEDURE — 97165 OT EVAL LOW COMPLEX 30 MIN: CPT

## 2023-03-02 PROCEDURE — 84484 ASSAY OF TROPONIN QUANT: CPT

## 2023-03-02 PROCEDURE — 93306 TTE W/DOPPLER COMPLETE: CPT

## 2023-03-02 PROCEDURE — 84295 ASSAY OF SERUM SODIUM: CPT

## 2023-03-02 PROCEDURE — 85018 HEMOGLOBIN: CPT

## 2023-03-02 PROCEDURE — 82565 ASSAY OF CREATININE: CPT

## 2023-03-02 PROCEDURE — 82947 ASSAY GLUCOSE BLOOD QUANT: CPT

## 2023-03-02 PROCEDURE — 85730 THROMBOPLASTIN TIME PARTIAL: CPT

## 2023-03-02 PROCEDURE — 83036 HEMOGLOBIN GLYCOSYLATED A1C: CPT

## 2023-03-02 RX ORDER — DEXTROSE 50 % IN WATER 50 %
12.5 SYRINGE (ML) INTRAVENOUS ONCE
Refills: 0 | Status: DISCONTINUED | OUTPATIENT
Start: 2023-03-02 | End: 2023-03-02

## 2023-03-02 RX ORDER — DEXTROSE 50 % IN WATER 50 %
25 SYRINGE (ML) INTRAVENOUS ONCE
Refills: 0 | Status: DISCONTINUED | OUTPATIENT
Start: 2023-03-02 | End: 2023-03-02

## 2023-03-02 RX ORDER — ACETAMINOPHEN 500 MG
2 TABLET ORAL
Qty: 40 | Refills: 0
Start: 2023-03-02 | End: 2023-03-06

## 2023-03-02 RX ORDER — POTASSIUM CHLORIDE 20 MEQ
20 PACKET (EA) ORAL ONCE
Refills: 0 | Status: COMPLETED | OUTPATIENT
Start: 2023-03-02 | End: 2023-03-02

## 2023-03-02 RX ORDER — ATORVASTATIN CALCIUM 80 MG/1
1 TABLET, FILM COATED ORAL
Qty: 30 | Refills: 0
Start: 2023-03-02 | End: 2023-03-31

## 2023-03-02 RX ORDER — GLUCAGON INJECTION, SOLUTION 0.5 MG/.1ML
1 INJECTION, SOLUTION SUBCUTANEOUS ONCE
Refills: 0 | Status: DISCONTINUED | OUTPATIENT
Start: 2023-03-02 | End: 2023-03-02

## 2023-03-02 RX ORDER — INSULIN LISPRO 100/ML
VIAL (ML) SUBCUTANEOUS
Refills: 0 | Status: DISCONTINUED | OUTPATIENT
Start: 2023-03-02 | End: 2023-03-02

## 2023-03-02 RX ORDER — SODIUM CHLORIDE 9 MG/ML
1000 INJECTION, SOLUTION INTRAVENOUS
Refills: 0 | Status: DISCONTINUED | OUTPATIENT
Start: 2023-03-02 | End: 2023-03-02

## 2023-03-02 RX ORDER — INSULIN LISPRO 100/ML
VIAL (ML) SUBCUTANEOUS AT BEDTIME
Refills: 0 | Status: DISCONTINUED | OUTPATIENT
Start: 2023-03-02 | End: 2023-03-02

## 2023-03-02 RX ORDER — ACETAMINOPHEN 500 MG
650 TABLET ORAL EVERY 6 HOURS
Refills: 0 | Status: DISCONTINUED | OUTPATIENT
Start: 2023-03-02 | End: 2023-03-02

## 2023-03-02 RX ORDER — DEXTROSE 50 % IN WATER 50 %
15 SYRINGE (ML) INTRAVENOUS ONCE
Refills: 0 | Status: DISCONTINUED | OUTPATIENT
Start: 2023-03-02 | End: 2023-03-02

## 2023-03-02 RX ORDER — LANOLIN ALCOHOL/MO/W.PET/CERES
3 CREAM (GRAM) TOPICAL AT BEDTIME
Refills: 0 | Status: DISCONTINUED | OUTPATIENT
Start: 2023-03-02 | End: 2023-03-02

## 2023-03-02 RX ADMIN — Medication 8: at 08:24

## 2023-03-02 RX ADMIN — ATORVASTATIN CALCIUM 80 MILLIGRAM(S): 80 TABLET, FILM COATED ORAL at 00:12

## 2023-03-02 RX ADMIN — Medication 20 MILLIEQUIVALENT(S): at 16:41

## 2023-03-02 RX ADMIN — LISINOPRIL 10 MILLIGRAM(S): 2.5 TABLET ORAL at 05:18

## 2023-03-02 RX ADMIN — AMLODIPINE BESYLATE 5 MILLIGRAM(S): 2.5 TABLET ORAL at 05:18

## 2023-03-02 RX ADMIN — TAMSULOSIN HYDROCHLORIDE 0.8 MILLIGRAM(S): 0.4 CAPSULE ORAL at 00:12

## 2023-03-02 NOTE — PHYSICAL THERAPY INITIAL EVALUATION ADULT - ACTIVE RANGE OF MOTION EXAMINATION, REHAB EVAL
RUE with sig. swelling limiting R hand/finger AROM/bilateral lower extremity Active ROM was WNL (within normal limits)/Left UE Active ROM was WFL (within functional limits)

## 2023-03-02 NOTE — DISCHARGE NOTE PROVIDER - NSDCCPGOAL_GEN_ALL_CORE_FT
Patient seen for follow-up medical care with multiple medical problems reviewed and discussed.  Laboratory tests have been reviewed and discussed.  Slight increase of the hemoglobin A1 c 6.2.  Discussion on nutrition lower carbohydrate intake, weight reduction and exercise program.  Recheck labs every 6 months.  Next appointment follow-up and laboratory test and Medicare wellness  Diagnoses and all orders for this visit:  Need for COVID-19 vaccine  -     COVID 19 PFIZER-[employer]  Essential hypertension  Mixed hyperlipidemia  Controlled type 2 diabetes mellitus with other specified complication, without long-term current use of insulin (CMS/[facility])  -     SERVICE TO OPHTHALMOLOGY  Vitamin D deficiency  Gastroesophageal reflux disease without esophagitis  JAMIE (obstructive sleep apnea)  Exposure to Agent Orange  Gilbert's disease  Type 2 diabetes mellitus with morbid obesity (CMS/[facility])  Coronary artery calcification seen on CT scan  Skin lesion of face  -     SERVICE TO DERMATOLOGY  Ischemic heart disease-with history of Agent Orange exposure      
To get better and follow your care plan as instructed.

## 2023-03-02 NOTE — SPEECH LANGUAGE PATHOLOGY EVALUATION - COMMENTS
Hx cont: Neuro Impression: Mild non-room spinning dizziness with recurrent falls of unclear etiology, potentially peripheral vertigo versus cardiac in etiology. Our service will assist in evaluating for ischemic stroke although low current clinical suspicion.    IMAGING:  CT HEAD: 3/1/23  IMPRESSION: No acute intracranial bleeding. Chronic posterior limb left internal capsule/corona radiata/centrum semiovale lacunar infarction.    Pt is unknown to this service.    Of note, pt passed dysphagia screener 3/1/23 at 18:57 and is currently on a regular diet and thin liquids. Discussed with Bee RAE, defer bedside swallow evaluation. Results and recommendations d/w pt/spouse, RNMarifer, and PABee. DNT-- pt reported he could not read English text. Czech text unavailable at this time.

## 2023-03-02 NOTE — OCCUPATIONAL THERAPY INITIAL EVALUATION ADULT - FINE MOTOR COORDINATION TRAINING, OT EVAL
Goal: Pt will independently manipulate buttons/zippers/fasteners on shirts/pants with rue in 2 weeks to increase independence for ADL performance

## 2023-03-02 NOTE — DISCHARGE NOTE PROVIDER - NSDCMRMEDTOKEN_GEN_ALL_CORE_FT
amLODIPine 5 mg oral tablet: 1 tab(s) orally once a day  aspirin 81 mg oral delayed release tablet: 1 tab(s) orally once a day  donepezil 5 mg oral tablet: 1 tab(s) orally once a day (at bedtime)  Jardiance 25 mg oral tablet: 1 tab(s) orally once a day (in the morning)  lisinopril 10 mg oral tablet: 1 tab(s) orally once a day  metFORMIN 1000 mg oral tablet: 1 tab(s) orally 2 times a day  pioglitazone 15 mg oral tablet: 1 tab(s) orally once a day  pravastatin 20 mg oral tablet: 1 tab(s) orally once a day  tamsulosin 0.4 mg oral capsule: 2 cap(s) orally once a day

## 2023-03-02 NOTE — OCCUPATIONAL THERAPY INITIAL EVALUATION ADULT - ADDITIONAL COMMENTS
Pt reports that he lives with his wife in a PH, +steps, Split level house, +tub in bathroom,. PTA pt independent in all adl's and functional activities

## 2023-03-02 NOTE — OCCUPATIONAL THERAPY INITIAL EVALUATION ADULT - DIAGNOSIS, OT EVAL
Pt appears to be functioning at baseline, +bruising and swelling right hand limiting fine motor activities with right hand

## 2023-03-02 NOTE — OCCUPATIONAL THERAPY INITIAL EVALUATION ADULT - RANGE OF MOTION EXAMINATION, UPPER EXTREMITY
with exception of right hand secondary to bruising/bilateral UE Active ROM was WNL (within normal limits)

## 2023-03-02 NOTE — DISCHARGE NOTE NURSING/CASE MANAGEMENT/SOCIAL WORK - PATIENT PORTAL LINK FT
You can access the FollowMyHealth Patient Portal offered by [facility] by registering at the following website: [email or web address]. By joining [facility]’s FollowMyHealth portal, you will also be able to view your health information using other applications (apps) compatible with our system.

## 2023-03-02 NOTE — SPEECH LANGUAGE PATHOLOGY EVALUATION - SLP PERTINENT HISTORY OF CURRENT PROBLEM
66 y/o RH M with PMH of HTN, HLD, DM, on asa who presents to ED for dizziness and recurrent falls. States LKW 2:30PM 3/1/23 after which he experienced mild dizziness and then fall. Had 4 subsequent falls since. Had fall on R hand for which it is injured and weak. He went to urgent care where they did EKG and were concerned for which told to come to ED. Denies speech disturbance. No h/o stroke. Currently mostly asymptomatic and appears comfortable. NIHSS: 0. Baseline MRS: 0. In ED code stroke called for gait instability, dizziness falls. CT HEAD revealed chronic posterior limb left internal capsule/corona radiata/centrum semiovale lacunar infarction. No intracranial bleeding. Not a TNKase candidate due to nondisabling isolated neurologic deficits that were improving. Not a thrombectomy candidate due to no LVO.

## 2023-03-02 NOTE — DISCHARGE NOTE PROVIDER - NSDCCPCAREPLAN_GEN_ALL_CORE_FT
PRINCIPAL DISCHARGE DIAGNOSIS  Diagnosis: Cardiac related syncope  Assessment and Plan of Treatment: Follow up with your pcp and cardiologist in 1 week  return if worsens      SECONDARY DISCHARGE DIAGNOSES  Diagnosis: Episodic weakness  Assessment and Plan of Treatment: Take meds as prescribed and follow up with pcp    Diagnosis: Type 2 diabetes mellitus  Assessment and Plan of Treatment: HA1C 8.3 take meds as prescribed follow up with pcp    Diagnosis: BPH (benign prostatic hyperplasia)  Assessment and Plan of Treatment: Take meds as prescribed and follow up with pcp    Diagnosis: Right hand pain  Assessment and Plan of Treatment: Tylenol for pain, Take meds as prescribed and follow up with pcp    Diagnosis: HTN (hypertension)  Assessment and Plan of Treatment: Take meds as prescribed and follow up with pcp    Diagnosis: Frequent falls  Assessment and Plan of Treatment: Fall precautions    Diagnosis: PFO (patent foramen ovale)  Assessment and Plan of Treatment: noted on Echo follow up with pcp and cardiology  bring these papers with you to PCP    Diagnosis: Thoracic aortic ectasia  Assessment and Plan of Treatment: 4.1 cm on imaging, found incidentally follow yo with pcp in a week

## 2023-03-02 NOTE — DISCHARGE NOTE PROVIDER - HOSPITAL COURSE
67M with PMHx T2DM, HTN, HLD, presents after an episode of lower extremity weakness with 5 falls within a period of about 5 minutes. Admitted for workup of CVA vs TIA  vs vertigo vs Cardiogenic etiology given elevated and downtrended troponin.      Problem/Plan - 1:  ·  Problem: Episodic weakness.   ·  Plan: TIA vs cardiogenic. Appreciate neuro recs. Unclear etiology at this time. Troponin downtrending, never had chest pain, SOB, or sx other than episode of weakness.  - Rec'd , c/w ASA 81  - Appreciate Neuro recs  - MRI brain  - Statin  - F/u A1c, lipids  - TTE w bubble study  - Telemetry.       Problem/Plan - 2:  ·  Problem: Falls, initial encounter.   ·  Plan: As above with episode of multiple falls today.   - Workup as above  - PT consult  - Fall precautions.     Problem/Plan - 3:  ·  Problem: Right hand pain.   ·  Plan: Fell onto hand, no fracture on XR  - Ice packs  - Tylenol  - Consider celecoxib 100 mg BID (d/t ASA) if pain not controlled with ice and tylenol.     Problem/Plan - 4:  ·  Problem: Type 2 diabetes mellitus.   ·  Plan: F/u A1c  - Sliding scale insulin and adjust, likely need to add basal bolus  - CC diet  - Hold home metformin, jardiance, pioglitazone.     Problem/Plan - 5:  ·  Problem: HTN (hypertension).   ·  Plan: C/w lisinopril, amlodipine.     Problem/Plan - 6:  ·  Problem: BPH (benign prostatic hyperplasia).   ·  Plan: C/w Flomax 0.8mg.     Problem/Plan - 7:  ·  Problem: HLD (hyperlipidemia).   ·  Plan: Atorva 80.     Problem/Plan - 8:  ·  Problem: Thoracic aortic ectasia.   ·  Plan: 4.1 cm on imaging, found incidentally.  - Outpatient monitoring.     Problem/Plan - 9:  ·  Problem: Need for prophylactic measure.   ·  Plan: DVT: Lovenox  Diet: CC, DASH  Dispo: PT consult.    MRI and echo done, pt cleared by Dr Muñoz for dc home   67M with PMHx T2DM, HTN, HLD, presents after an episode of lower extremity weakness with 5 falls within a period of about 5 minutes. Admitted for workup of CVA vs TIA  vs vertigo vs Cardiogenic etiology given elevated and downtrended troponin.      Problem/Plan - 1:  ·  Problem: Episodic weakness.   ·  Plan: TIA vs cardiogenic. Appreciate neuro recs. Unclear etiology at this time. Troponin downtrending, never had chest pain, SOB, or sx other than episode of weakness.  - Rec'd , c/w ASA 81  - Appreciate Neuro recs  - MRI brain- no acute findings  - Statin  - F/u A1c, lipids  - TTE w bubble study - +PFO, otherwise normal  - Telemetry- no events       Problem/Plan - 2:  ·  Problem: Falls, initial encounter.   ·  Plan: As above with episode of multiple falls today.   - Workup as above  - PT consult  - Fall precautions.     Problem/Plan - 3:  ·  Problem: Right hand pain.   ·  Plan: Fell onto hand, no fracture on XR  - Ice packs  - Tylenol  - Consider celecoxib 100 mg BID (d/t ASA) if pain not controlled with ice and tylenol.     Problem/Plan - 4:  ·  Problem: Type 2 diabetes mellitus.   ·  Plan: F/u A1c  - Sliding scale insulin and adjust, likely need to add basal bolus  - CC diet  - Hold home metformin, jardiance, pioglitazone.     Problem/Plan - 5:  ·  Problem: HTN (hypertension).   ·  Plan: C/w lisinopril, amlodipine.     Problem/Plan - 6:  ·  Problem: BPH (benign prostatic hyperplasia).   ·  Plan: C/w Flomax 0.8mg.     Problem/Plan - 7:  ·  Problem: HLD (hyperlipidemia).   ·  Plan: Atorva 80.     Problem/Plan - 8:  ·  Problem: Thoracic aortic ectasia.   ·  Plan: 4.1 cm on imaging, found incidentally.  - Outpatient monitoring.     Problem/Plan - 9:  ·  Problem: Need for prophylactic measure.   ·  Plan: DVT: Lovenox  Diet: CC, DASH  Dispo: PT consult.    MRI and echo done, pt cleared by Dr Muñoz for dc home   67M with PMHx T2DM, HTN, HLD, presents after an episode of lower extremity weakness with 5 falls within a period of about 5 minutes. Admitted for workup of CVA vs TIA  vs vertigo vs Cardiogenic etiology given elevated and downtrended troponin.      Problem/Plan - 1:  ·  Problem: Episodic weakness. - Stroke and TIA were ruled out. Suspect cardiogenic etiology that was not identified in the hospital. No events noted. Asymptomatic currently.    Troponin downtrending, never had chest pain, SOB, or sx other than episode of weakness.  - c/w ASA 81  - MRI brain- no acute findings  - TTE w bubble study - +PFO, otherwise normal  - F/u A1c, lipids  - Telemetry- no events       Problem/Plan - 2:  ·  Problem: Falls, initial encounter.   ·  Plan: As above with episode of multiple falls today.   - Workup as above  - PT consult  - Fall precautions.     Problem/Plan - 3:  ·  Problem: Right hand pain.   ·  Plan: Fell onto hand, no fracture on XR  - Ice packs  - Tylenol  - Consider celecoxib 100 mg BID (d/t ASA) if pain not controlled with ice and tylenol.     Problem/Plan - 4:  ·  Problem: Type 2 diabetes mellitus.   ·  Plan: F/u A1c  - Sliding scale insulin and adjust, likely need to add basal bolus  - CC diet  - Hold home metformin, jardiance, pioglitazone.     Problem/Plan - 5:  ·  Problem: HTN (hypertension).   ·  Plan: C/w lisinopril, amlodipine.     Problem/Plan - 6:  ·  Problem: BPH (benign prostatic hyperplasia).   ·  Plan: C/w Flomax 0.8mg.     Problem/Plan - 7:  ·  Problem: HLD (hyperlipidemia).   ·  Plan: Atorva 80.     Problem/Plan - 8:  ·  Problem: Thoracic aortic ectasia.   ·  Plan: 4.1 cm on imaging, found incidentally.  - Outpatient monitoring.     Problem/Plan - 9:  ·  Problem: Need for prophylactic measure.   ·  Plan: DVT: Lovenox  Diet: CC, DASH  Dispo: PT consult.    MRI and echo done, pt cleared by Dr Muñoz for IN home

## 2023-03-02 NOTE — SPEECH LANGUAGE PATHOLOGY EVALUATION - SLP GENERAL OBSERVATIONS
Pt was received in ED stretcher awake and alert with spouse present. +room air. Language Line utilized for translation to Thai (In [place] #[id number]). Pt was cooperative with evaluation.

## 2023-03-02 NOTE — DISCHARGE NOTE PROVIDER - CARE PROVIDER_API CALL
COLETTE SALEH  Internal Medicine  [address] [facility], [facility] [place] [address]  Phone: [phone]  Fax: [phone]  Established Patient  Follow Up Time: 1-3 days

## 2023-03-02 NOTE — OCCUPATIONAL THERAPY INITIAL EVALUATION ADULT - PERTINENT HX OF CURRENT PROBLEM, REHAB EVAL
Patient BROOKLYN is a 68yo R handed M with PMHx HTN, HLD, DM, on asa who presents to ED for dizziness and recurrent falls. States LKW 2:30PM 3/1/23 after which he experienced mild dizziness and then fall. Had 4 subsequent falls since. Had fall on R hand for which it is injured and weak.  He went to urgent care where they did EKG and were concerned for which told to come to ED. Denies headaches, nausea, vomiting, visual symptoms, speech disturbance, focal extremity numbness/weakness not attributed to injury. No history of stroke. Currently mostly asymptomatic and appears comfortable. In ED code stroke called for gait instability, dizziness falls. Examined patient in CT. Patient denied hx of renal dysfunction, contrast allergy and was amenable to contrast. CT head w/o con and CTA head/neck were obtained. cbc coags wnl. cmp w/ elevated glucose 282. troponin elevated 63. lactate 2.5. XR hand 02/01/23 (-) XR chest 02/01/23(-). CT brain stroke protocol 02/01/23 No acute intracranial bleeding. 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 CT BRAIN 02/01/23: Hypoplastic right A1 segment and right vertebral artery are again seen. These are better demonstrated on CTA of the head and neck from same   date. CERVICAL SPINE 02/01/23: Within normal limits. CTA BRAIN: Patent central intracranial circulation. No flow-limiting stenosis or occlusion. Anatomic variants: Bilateral fetal PCA circulation. Hypoplastic right A1   segment. CTA NECK 02/01/23: Patent cervical vasculature. No flow-limiting stenosis or occlusion. Ascending aortic ectasia to 4.1 cm.
Patient BROOKLYN is a 66yo R handed M with PMHx HTN, HLD, DM, on asa who presents to ED for dizziness and recurrent falls. States LKW 2:30PM 3/1/23 after which he experienced mild dizziness and then fall. Had 4 subsequent falls since. Had fall on R hand for which it is injured and weak.  He went to urgent care where they did EKG and were concerned for which told to come to ED. Denies headaches, nausea, vomiting, visual symptoms, speech disturbance, focal extremity numbness/weakness not attributed to injury. No history of stroke. Currently mostly asymptomatic and appears comfortable. In ED code stroke called for gait instability, dizziness falls. Examined patient in CT. Patient denied hx of renal dysfunction, contrast allergy and was amenable to contrast. CT head w/o con and CTA head/neck were obtained. cbc coags wnl. cmp w/ elevated glucose 282. troponin elevated 63. lactate 2.5. XR hand 02/01/23 (-) XR chest 02/01/23(-). CT brain stroke protocol 02/01/23 No acute intracranial bleeding. 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 CT BRAIN 02/01/23: Hypoplastic right A1 segment and right vertebral artery are again seen. These are better demonstrated on CTA of the head and neck from same   date. CERVICAL SPINE 02/01/23: Within normal limits. CTA BRAIN: Patent central intracranial circulation. No flow-limiting stenosis or occlusion. Anatomic variants: Bilateral fetal PCA circulation. Hypoplastic right A1   segment. CTA NECK 02/01/23: Patent cervical vasculature. No flow-limiting stenosis or occlusion. Ascending aortic ectasia to 4.1 cm.

## 2023-03-02 NOTE — PHYSICAL THERAPY INITIAL EVALUATION ADULT - PERTINENT HX OF CURRENT PROBLEM, REHAB EVAL
Patient BROOKLYN is a 66yo R handed M with PMHx HTN, HLD, DM, on asa who presents to ED for dizziness and recurrent falls. States LKW 2:30PM 3/1/23 after which he experienced mild dizziness and then fall. Had 4 subsequent falls since. Had fall on R hand for which it is injured and weak.  He went to urgent care where they did EKG and were concerned for which told to come to ED. Denies headaches, nausea, vomiting, visual symptoms, speech disturbance, focal extremity numbness/weakness not attributed to injury. No history of stroke. Currently mostly asymptomatic and appears comfortable. In ED code stroke called for gait instability, dizziness falls. Examined patient in CT. Patient denied hx of renal dysfunction, contrast allergy and was amenable to contrast. CT head w/o con and CTA head/neck were obtained. cbc coags wnl. cmp w/ elevated glucose 282. troponin elevated 63. lactate 2.5. XR hand 02/01/23 (-) XR chest 02/01/23(-). CT brain stroke protocol 02/01/23 No acute intracranial bleeding. 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 CT BRAIN 02/01/23: Hypoplastic right A1 segment and right vertebral artery are again seen. These are better demonstrated on CTA of the head and neck from same   date. CERVICAL SPINE 02/01/23: Within normal limits. CTA BRAIN: Patent central intracranial circulation. No flow-limiting stenosis or occlusion. Anatomic variants: Bilateral fetal PCA circulation. Hypoplastic right A1   segment. CTA NECK 02/01/23: Patent cervical vasculature. No flow-limiting stenosis or occlusion. Ascending aortic ectasia to 4.1 cm.

## 2023-03-02 NOTE — DISCHARGE NOTE NURSING/CASE MANAGEMENT/SOCIAL WORK - NSDCPEFALRISK_GEN_ALL_CORE
For information on Fall & Injury Prevention, visit: [email or web address] OR  [email or web address] OR  https://www.cdc.gov/steadi/patient.html

## 2023-03-02 NOTE — SPEECH LANGUAGE PATHOLOGY EVALUATION - SLP DIAGNOSIS
66 y/o Chinese-Speaking M p/w mild non-room spinning dizziness with recurrent falls (ddx: peripheral vertigo vs cardiac in etiology, low suspicion for stroke). CTH negative, pending MRI. Pt was seen today for Speech Language Evaluation which revealed speech, expressive and receptive language, and cognitive-linguistic abilities WFL. Speech was fluent, grammatical and devoid of word-finding difficulties. Comprehension appeared intact (able to follow multi-step directions and answer complex Y/N questions). Repetition intact. Naming WFL. Initially, pt with incorrect numbering on clock drawing task; however, able to correct when pt made aware of error. No evidence of dysarthria.

## 2023-03-13 RX ORDER — EMPAGLIFLOZIN 10 MG/1
1 TABLET, FILM COATED ORAL
Qty: 0 | Refills: 0 | DISCHARGE

## 2023-03-13 RX ORDER — ASPIRIN/CALCIUM CARB/MAGNESIUM 324 MG
1 TABLET ORAL
Qty: 0 | Refills: 0 | DISCHARGE

## 2023-03-13 RX ORDER — PIOGLITAZONE HYDROCHLORIDE 15 MG/1
1 TABLET ORAL
Qty: 0 | Refills: 0 | DISCHARGE

## 2023-03-13 RX ORDER — DONEPEZIL HYDROCHLORIDE 10 MG/1
1 TABLET, FILM COATED ORAL
Qty: 0 | Refills: 0 | DISCHARGE

## 2023-03-13 RX ORDER — LISINOPRIL 2.5 MG/1
1 TABLET ORAL
Qty: 0 | Refills: 0 | DISCHARGE

## 2023-03-13 RX ORDER — TAMSULOSIN HYDROCHLORIDE 0.4 MG/1
2 CAPSULE ORAL
Qty: 0 | Refills: 0 | DISCHARGE

## 2023-03-13 RX ORDER — AMLODIPINE BESYLATE 2.5 MG/1
1 TABLET ORAL
Qty: 0 | Refills: 0 | DISCHARGE

## 2023-03-13 RX ORDER — METFORMIN HYDROCHLORIDE 850 MG/1
1 TABLET ORAL
Qty: 0 | Refills: 0 | DISCHARGE

## 2023-04-26 NOTE — PHYSICAL THERAPY INITIAL EVALUATION ADULT - ADDITIONAL COMMENTS
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    Short term goal (STG): E Pt will create a schedule or routine including possible hobbies, activities, and connection with others to help her make progress to staying out of her room all day.'      Intervention/techniques: Informed, Validated/Supported, Prompted/Cued, Listened/Empathized, Clarified, Reinforced, and Provided Feedback    Patient mental status/affect: Calm and Congruent    Patient behavior/appearance: Attentive, Cooperative, and Motivated    Special patient treatment accommodations provided (describe): None    Patient response and progress towards goals: Focus of session was on developing understanding of how thoughts lead to emotions and behaviors as taught in Cognitive Behavioral Therapy. We spoke about the technique of knowing your ABC's in CBT. Group members learned about how A stands for the activating events that is a real external event that has occurred, or a trigger.   B is for beliefs or thoughts, attitudes, meanings we attach to A, and C is for consequences and our behaviors that are either constructive or destructive as a result. We discussed recent triggers and what we thought, believed, and felt about it as well as what our choice is in regards to our actions  Pt participated in the group activity and discussion. She engaged in a discussion with another member related to [employer] and the part it had played in her life and the decisions she has made. She feels that she has worked so much over her life and has not taken time to enjoy it.  She would like to work through her grief and find things to do now that make her happy Pt. lives with wife in split level house with 2 JENN[address]ER and one flight to the bedroom, +tub. Patient states he was independent in all ADLs/IADLs prior to admission, +drive. Pt states this was his first fall in the past year. Pt. does not own any AD.

## 2023-05-15 NOTE — STROKE CODE NOTE - IV ALTEPLASE EXCL REL HIDDEN
Gen: Well appearing in NAD  Head: NC/AT  Neck: trachea midline  Resp:  No distress  RIGHT FOOT: + swelling to proximal anterior foot and lateral mal, no erythema no warmth 2+ dp, sensation intact ROM intact. no calf tenderness compartment soft.   Neuro:  A&O appears non focal  Skin:  Warm and dry as visualized  Psych:  Normal affect and mood show
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
## 2024-04-22 PROBLEM — E11.9 TYPE 2 DIABETES MELLITUS WITHOUT COMPLICATIONS: Chronic | Status: ACTIVE | Noted: 2023-03-01

## 2024-04-22 PROBLEM — I10 ESSENTIAL (PRIMARY) HYPERTENSION: Chronic | Status: ACTIVE | Noted: 2023-03-01

## 2024-04-22 PROBLEM — E78.5 HYPERLIPIDEMIA, UNSPECIFIED: Chronic | Status: ACTIVE | Noted: 2023-03-01

## 2024-04-22 PROBLEM — N40.0 BENIGN PROSTATIC HYPERPLASIA WITHOUT LOWER URINARY TRACT SYMPTOMS: Chronic | Status: ACTIVE | Noted: 2023-03-01

## 2024-04-24 PROBLEM — Z00.00 ENCOUNTER FOR PREVENTIVE HEALTH EXAMINATION: Status: ACTIVE | Noted: 2024-04-24

## 2024-05-22 ENCOUNTER — APPOINTMENT (OUTPATIENT)
Dept: UROLOGY | Facility: CLINIC | Age: 69
End: 2024-05-22
Payer: MEDICARE

## 2024-05-22 VITALS
DIASTOLIC BLOOD PRESSURE: 90 MMHG | SYSTOLIC BLOOD PRESSURE: 141 MMHG | TEMPERATURE: 98.3 F | OXYGEN SATURATION: 97 % | HEART RATE: 83 BPM

## 2024-05-22 DIAGNOSIS — Z86.39 PERSONAL HISTORY OF OTHER ENDOCRINE, NUTRITIONAL AND METABOLIC DISEASE: ICD-10-CM

## 2024-05-22 DIAGNOSIS — N13.8 BENIGN PROSTATIC HYPERPLASIA WITH LOWER URINARY TRACT SYMPMS: ICD-10-CM

## 2024-05-22 DIAGNOSIS — R97.20 ELEVATED PROSTATE, SPECIFIC ANTIGEN [PSA]: ICD-10-CM

## 2024-05-22 DIAGNOSIS — Z78.9 OTHER SPECIFIED HEALTH STATUS: ICD-10-CM

## 2024-05-22 DIAGNOSIS — N40.1 BENIGN PROSTATIC HYPERPLASIA WITH LOWER URINARY TRACT SYMPMS: ICD-10-CM

## 2024-05-22 DIAGNOSIS — Z86.79 PERSONAL HISTORY OF OTHER DISEASES OF THE CIRCULATORY SYSTEM: ICD-10-CM

## 2024-05-22 PROCEDURE — 99204 OFFICE O/P NEW MOD 45 MIN: CPT

## 2024-05-22 NOTE — PHYSICAL EXAM
[Normal Appearance] : normal appearance [Well Groomed] : well groomed [General Appearance - In No Acute Distress] : no acute distress [Edema] : no peripheral edema [Respiration, Rhythm And Depth] : normal respiratory rhythm and effort [Exaggerated Use Of Accessory Muscles For Inspiration] : no accessory muscle use [Abdomen Soft] : soft [Abdomen Tenderness] : non-tender [Costovertebral Angle Tenderness] : no ~M costovertebral angle tenderness [Urethral Meatus] : meatus normal [Penis Abnormality] : normal circumcised penis [Urinary Bladder Findings] : the bladder was normal on palpation [Scrotum] : the scrotum was normal [Epididymis] : the epididymides were normal [Testes Tenderness] : no tenderness of the testes [Testes Mass (___cm)] : there were no testicular masses [Prostate Tenderness] : the prostate was not tender [No Prostate Nodules] : no prostate nodules [Prostate Size ___ gm] : prostate size [unfilled] gm [Normal Station and Gait] : the gait and station were normal for the patient's age [] : no rash [No Focal Deficits] : no focal deficits [Oriented To Time, Place, And Person] : oriented to person, place, and time [Affect] : the affect was normal [Mood] : the mood was normal [No Palpable Adenopathy] : no palpable adenopathy

## 2024-05-22 NOTE — PHYSICAL EXAM
[Normal Appearance] : normal appearance [Well Groomed] : well groomed [General Appearance - In No Acute Distress] : no acute distress [Edema] : no peripheral edema [Respiration, Rhythm And Depth] : normal respiratory rhythm and effort [Abdomen Soft] : soft [Exaggerated Use Of Accessory Muscles For Inspiration] : no accessory muscle use [Abdomen Tenderness] : non-tender [Costovertebral Angle Tenderness] : no ~M costovertebral angle tenderness [Urethral Meatus] : meatus normal [Urinary Bladder Findings] : the bladder was normal on palpation [Penis Abnormality] : normal circumcised penis [Scrotum] : the scrotum was normal [Epididymis] : the epididymides were normal [Testes Tenderness] : no tenderness of the testes [Testes Mass (___cm)] : there were no testicular masses [Prostate Tenderness] : the prostate was not tender [No Prostate Nodules] : no prostate nodules [Prostate Size ___ gm] : prostate size [unfilled] gm [Normal Station and Gait] : the gait and station were normal for the patient's age [] : no rash [No Focal Deficits] : no focal deficits [Oriented To Time, Place, And Person] : oriented to person, place, and time [Affect] : the affect was normal [Mood] : the mood was normal [No Palpable Adenopathy] : no palpable adenopathy

## 2024-05-24 PROBLEM — N40.1 BPH WITH OBSTRUCTION/LOWER URINARY TRACT SYMPTOMS: Status: ACTIVE | Noted: 2024-05-22

## 2024-05-24 PROBLEM — R97.20 ELEVATED PSA: Status: ACTIVE | Noted: 2024-05-22

## 2024-05-24 LAB
APPEARANCE: CLEAR
BACTERIA: NEGATIVE /HPF
BILIRUBIN URINE: NEGATIVE
BLOOD URINE: NEGATIVE
CAST: 0 /LPF
COLOR: YELLOW
EPITHELIAL CELLS: 0 /HPF
GLUCOSE QUALITATIVE U: >=1000 MG/DL
KETONES URINE: NEGATIVE MG/DL
LEUKOCYTE ESTERASE URINE: NEGATIVE
MICROSCOPIC-UA: NORMAL
NITRITE URINE: NEGATIVE
PH URINE: 7
PROTEIN URINE: NEGATIVE MG/DL
PSA SERPL-MCNC: 7.03 NG/ML
RED BLOOD CELLS URINE: 0 /HPF
SPECIFIC GRAVITY URINE: >1.03
UROBILINOGEN URINE: 0.2 MG/DL
WHITE BLOOD CELLS URINE: 0 /HPF

## 2024-05-24 NOTE — HISTORY OF PRESENT ILLNESS
[FreeTextEntry1] : 69 yo Yi speaking M referred for elevated PSA no obstructive LUTS history of BPH - has been on tamsulosin for many years prescribed by PCP never saw urologist before Labwork done about 1 month ago

## 2024-05-24 NOTE — ASSESSMENT
[FreeTextEntry1] : 69 yo M with elevated PSA and BPH  - PVR = 75ml - Repeat PSA today - Discussed possible etiologies for elevated PSA including benign vs. malignancy - Discussed pros and cons of proceeding with a prostate needle biopsy. Discussed risk and benefits including infection, hematochezia, hematuria, hematospermia as well as specificity and sensitivity of the biopsy.  - Discussed the role of prostate MRI in the workup of elevated PSA as well - Once PSA confirmed, will plan for prostate MRI to check for targetable lesions in anticipation of fusion biopsy  All BPH treatment options were reviewed. This included surveillance, all medical therapeutic options, all outlet procedures including office based, TURP, bipolar TURP, button vaporization, thulium/holmium, suprapubic/retropubic simple (open, robotic) prostatectomy. - UA, culture - COntinue tamsulosin

## 2024-05-24 NOTE — HISTORY OF PRESENT ILLNESS
[FreeTextEntry1] : 67 yo Japanese speaking M referred for elevated PSA no obstructive LUTS history of BPH - has been on tamsulosin for many years prescribed by PCP never saw urologist before Labwork done about 1 month ago

## 2024-05-24 NOTE — ASSESSMENT
[FreeTextEntry1] : 67 yo M with elevated PSA and BPH  - PVR = 75ml - Repeat PSA today - Discussed possible etiologies for elevated PSA including benign vs. malignancy - Discussed pros and cons of proceeding with a prostate needle biopsy. Discussed risk and benefits including infection, hematochezia, hematuria, hematospermia as well as specificity and sensitivity of the biopsy.  - Discussed the role of prostate MRI in the workup of elevated PSA as well - Once PSA confirmed, will plan for prostate MRI to check for targetable lesions in anticipation of fusion biopsy  All BPH treatment options were reviewed. This included surveillance, all medical therapeutic options, all outlet procedures including office based, TURP, bipolar TURP, button vaporization, thulium/holmium, suprapubic/retropubic simple (open, robotic) prostatectomy. - UA, culture - COntinue tamsulosin

## 2024-05-30 LAB — BACTERIA UR CULT: NORMAL

## 2024-06-10 ENCOUNTER — APPOINTMENT (OUTPATIENT)
Dept: MRI IMAGING | Facility: CLINIC | Age: 69
End: 2024-06-10
Payer: MEDICARE

## 2024-06-10 ENCOUNTER — RESULT REVIEW (OUTPATIENT)
Age: 69
End: 2024-06-10

## 2024-06-10 PROCEDURE — A9585: CPT | Mod: JW

## 2024-06-10 PROCEDURE — 76498P: CUSTOM

## 2024-06-10 PROCEDURE — 72197 MRI PELVIS W/O & W/DYE: CPT

## 2024-12-19 ENCOUNTER — APPOINTMENT (OUTPATIENT)
Age: 69
End: 2024-12-19

## 2024-12-19 VITALS
SYSTOLIC BLOOD PRESSURE: 127 MMHG | HEIGHT: 62 IN | DIASTOLIC BLOOD PRESSURE: 81 MMHG | OXYGEN SATURATION: 98 % | TEMPERATURE: 98.3 F | RESPIRATION RATE: 16 BRPM | WEIGHT: 168 LBS | HEART RATE: 89 BPM | BODY MASS INDEX: 30.91 KG/M2

## 2024-12-19 PROCEDURE — G2211 COMPLEX E/M VISIT ADD ON: CPT

## 2024-12-19 PROCEDURE — 99214 OFFICE O/P EST MOD 30 MIN: CPT

## 2024-12-23 LAB — PSA SERPL-MCNC: 6.5 NG/ML

## 2025-06-23 ENCOUNTER — APPOINTMENT (OUTPATIENT)
Age: 70
End: 2025-06-23
Payer: MEDICARE

## 2025-06-23 VITALS
TEMPERATURE: 98.5 F | DIASTOLIC BLOOD PRESSURE: 81 MMHG | SYSTOLIC BLOOD PRESSURE: 124 MMHG | HEART RATE: 81 BPM | OXYGEN SATURATION: 97 %

## 2025-06-23 PROCEDURE — G2211 COMPLEX E/M VISIT ADD ON: CPT

## 2025-06-23 PROCEDURE — 99214 OFFICE O/P EST MOD 30 MIN: CPT

## 2025-06-24 LAB — PSA SERPL-MCNC: 9.29 NG/ML
